# Patient Record
Sex: MALE | Employment: FULL TIME | URBAN - METROPOLITAN AREA
[De-identification: names, ages, dates, MRNs, and addresses within clinical notes are randomized per-mention and may not be internally consistent; named-entity substitution may affect disease eponyms.]

---

## 2018-10-17 ENCOUNTER — ED HISTORICAL/CONVERTED ENCOUNTER (OUTPATIENT)
Dept: OTHER | Age: 38
End: 2018-10-17

## 2020-06-10 ENCOUNTER — APPOINTMENT (OUTPATIENT)
Dept: GENERAL RADIOLOGY | Age: 40
End: 2020-06-10
Attending: PHYSICIAN ASSISTANT
Payer: SELF-PAY

## 2020-06-10 ENCOUNTER — APPOINTMENT (OUTPATIENT)
Dept: CT IMAGING | Age: 40
End: 2020-06-10
Attending: PHYSICIAN ASSISTANT
Payer: SELF-PAY

## 2020-06-10 ENCOUNTER — HOSPITAL ENCOUNTER (EMERGENCY)
Age: 40
Discharge: HOME | End: 2020-06-10
Attending: EMERGENCY MEDICINE | Admitting: EMERGENCY MEDICINE
Payer: SELF-PAY

## 2020-06-10 VITALS
HEIGHT: 66 IN | HEART RATE: 96 BPM | OXYGEN SATURATION: 95 % | WEIGHT: 172 LBS | SYSTOLIC BLOOD PRESSURE: 147 MMHG | BODY MASS INDEX: 27.64 KG/M2 | TEMPERATURE: 97.2 F | RESPIRATION RATE: 16 BRPM | DIASTOLIC BLOOD PRESSURE: 99 MMHG

## 2020-06-10 DIAGNOSIS — S69.92XA INJURY OF FINGER OF LEFT HAND, INITIAL ENCOUNTER: ICD-10-CM

## 2020-06-10 DIAGNOSIS — S89.92XA INJURY OF LEFT LOWER LEG, INITIAL ENCOUNTER: ICD-10-CM

## 2020-06-10 DIAGNOSIS — S16.1XXA NECK STRAIN, INITIAL ENCOUNTER: ICD-10-CM

## 2020-06-10 DIAGNOSIS — M54.50 LOW BACK PAIN WITHOUT SCIATICA, UNSPECIFIED BACK PAIN LATERALITY, UNSPECIFIED CHRONICITY: ICD-10-CM
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
## 2020-06-10 PROCEDURE — 73590 X-RAY EXAM OF LOWER LEG: CPT

## 2020-06-10 PROCEDURE — 99282 EMERGENCY DEPT VISIT SF MDM: CPT

## 2020-06-10 PROCEDURE — 73130 X-RAY EXAM OF HAND: CPT

## 2020-06-10 PROCEDURE — 72125 CT NECK SPINE W/O DYE: CPT

## 2020-06-10 RX ORDER — CYCLOBENZAPRINE HCL 10 MG
10 TABLET ORAL
Qty: 20 TAB | Refills: 0 | Status: SHIPPED | OUTPATIENT
Start: 2020-06-10

## 2020-06-10 RX ORDER — KETOROLAC TROMETHAMINE 10 MG/1
10 TABLET, FILM COATED ORAL
Qty: 20 TAB | Refills: 0 | Status: SHIPPED | OUTPATIENT
Start: 2020-06-10

## 2020-06-10 RX ORDER — ACETAMINOPHEN 325 MG/1
650 TABLET ORAL
Qty: 20 TAB | Refills: 0 | Status: SHIPPED | OUTPATIENT
Start: 2020-06-10 | End: 2022-04-07 | Stop reason: ALTCHOICE

## 2020-06-10 NOTE — LETTER
[address] [place] [address] [facility] EMERGENCY DEPT 
[address] [facility] [address] [phone] Work/School Note Date: 6/10/2020 To Whom It May concern: 
 
Ld Miguel was seen and treated today in the emergency room by the following provider(s): 
Attending Provider: Vita Belle MD 
Physician Assistant: ELENO Daniels. Ld Miguel may return to work on 6/13/20 Sincerely, ELENO Pettit
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
## 2020-06-10 NOTE — ED NOTES
noticed in patient's hospital chart patient's insurance and PCP was not listed.       went into PIT to assess patient. Patient stated confirmed he did not have insurance and he does not see a PCP.       and patient spoke about [facility].  explained to patient what  [facility] is and the benefits of applying. Patient  was given a list of documentation needed. Patient was told to bring in ID, Proof of 3 months of income and complete bank statement. Patient gave  a copy of his ID.  made copy and handed patient his ID back.      encouraged patient to apply for medicaid. Patient was given [employer] phone number and [employer] web site.       explained the importance of having a PCP.  offered to help find patient a PCP and have him scheduled for follow-up care. Patient stated he needs to talk it over with her sister surekha and his wife will give  a call.      Patient was given  contact information and [facility].
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
## 2022-03-29 ENCOUNTER — HOSPITAL ENCOUNTER (EMERGENCY)
Age: 42
Discharge: HOME | End: 2022-03-29
Attending: EMERGENCY MEDICINE | Admitting: EMERGENCY MEDICINE
Payer: COMMERCIAL

## 2022-03-29 ENCOUNTER — APPOINTMENT (OUTPATIENT)
Dept: GENERAL RADIOLOGY | Age: 42
End: 2022-03-29
Attending: EMERGENCY MEDICINE
Payer: COMMERCIAL

## 2022-03-29 VITALS
OXYGEN SATURATION: 96 % | WEIGHT: 170 LBS | SYSTOLIC BLOOD PRESSURE: 149 MMHG | HEIGHT: 66 IN | HEART RATE: 88 BPM | TEMPERATURE: 98.1 F | BODY MASS INDEX: 27.32 KG/M2 | DIASTOLIC BLOOD PRESSURE: 112 MMHG | RESPIRATION RATE: 18 BRPM

## 2022-03-29 DIAGNOSIS — S52.592A OTHER CLOSED FRACTURE OF DISTAL END OF LEFT RADIUS, INITIAL ENCOUNTER: Primary | ICD-10-CM

## 2022-03-29 PROCEDURE — 74011250637 HC RX REV CODE- 250/637: Performed by: EMERGENCY MEDICINE

## 2022-03-29 PROCEDURE — 99283 EMERGENCY DEPT VISIT LOW MDM: CPT

## 2022-03-29 PROCEDURE — 73110 X-RAY EXAM OF WRIST: CPT

## 2022-03-29 PROCEDURE — 75810000053 HC SPLINT APPLICATION

## 2022-03-29 RX ORDER — HYDROCODONE BITARTRATE AND ACETAMINOPHEN 5; 325 MG/1; MG/1
1 TABLET ORAL ONCE
Status: COMPLETED | OUTPATIENT
Start: 2022-03-29 | End: 2022-03-29

## 2022-03-29 RX ORDER — HYDROCODONE BITARTRATE AND ACETAMINOPHEN 5; 325 MG/1; MG/1
1 TABLET ORAL
Qty: 12 TABLET | Refills: 0 | Status: SHIPPED | OUTPATIENT
Start: 2022-03-29 | End: 2022-04-01

## 2022-03-29 RX ADMIN — HYDROCODONE BITARTRATE AND ACETAMINOPHEN 1 TABLET: 5; 325 TABLET ORAL at 01:59
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
## 2022-03-29 NOTE — ED NOTES
I have reviewed discharge instructions and medications  with the patient. The patient verbalized understanding.

## 2022-04-07 ENCOUNTER — OFFICE VISIT (OUTPATIENT)
Dept: ORTHOPEDIC SURGERY | Age: 42
End: 2022-04-07
Payer: COMMERCIAL

## 2022-04-07 VITALS
HEART RATE: 89 BPM | WEIGHT: 173 LBS | HEIGHT: 65 IN | SYSTOLIC BLOOD PRESSURE: 125 MMHG | DIASTOLIC BLOOD PRESSURE: 89 MMHG | BODY MASS INDEX: 28.82 KG/M2 | OXYGEN SATURATION: 99 %

## 2022-04-07 DIAGNOSIS — S52.572A OTHER CLOSED INTRA-ARTICULAR FRACTURE OF DISTAL END OF LEFT RADIUS, INITIAL ENCOUNTER: Primary | ICD-10-CM

## 2022-04-07 DIAGNOSIS — G56.02 LEFT CARPAL TUNNEL SYNDROME: ICD-10-CM

## 2022-04-07 PROCEDURE — 99203 OFFICE O/P NEW LOW 30 MIN: CPT | Performed by: ORTHOPAEDIC SURGERY

## 2022-04-07 RX ORDER — HYDROCODONE BITARTRATE AND ACETAMINOPHEN 5; 325 MG/1; MG/1
1 TABLET ORAL
Qty: 20 TABLET | Refills: 0 | Status: SHIPPED | OUTPATIENT
Start: 2022-04-07 | End: 2022-04-12

## 2022-04-07 NOTE — LETTER
Patient: Pablito Preston                        PROCEDURE: Left Distal Radius Open Reduction Internal Fixation, Possible Carpal Tunnel Release    PRE OPERATIVE INSTRUCTIONS:  Five (5) days prior to surgery STOP taking any hormonal medications, aspirin, fish oil and/or anti-inflammatory medications (this includes ibuprofens and Aleve). Tylenol is okay during this time. If you are taking blood thinner medication (such as Coumadin, Plavix, Heparin or others) you will need special instructions from the prescribing physician. LABS: Expect a call from a PAT nurse to complete a health assessment. o NO LABS NECESSARY    Surgery Date: 4/12/2022  Time: 12:30pm  Report to [facility] on the [address] [place] [address] at: 10:30am    THE DAY OF SURGERY:         1. Do not eat, chew gum or drink anything after midnight prior to the date of your surgery. 2. Take your blood pressure and/or heart medications with small sips of water unless otherwise instructed. If you are insulin dependent, bring your insulin with you, unless otherwise instructed. 3. Bring a list of your medications and the dosage to the hospital, including  vitamins. 4. Do not wear nail polish, acrylic nails, make-up, jewelry, perfumes or skin  creams. 5. Do not bring valuables or money to the hospital.        6. You MUST have a responsible adult arranged to drive you home following surgery. It is recommended that they stay with you for 24 hours after surgery. Post op visit  appointment is scheduled with Dr. Dennis Mayfield       on 4/25/2022 @ 11:30am at the [facility] office.       Mckenna Escobar, Surgery Scheduler  [phone]

## 2022-04-07 NOTE — LETTER
[address] [facility]    Surgery Request Form for the Operating Room at [facility]        Fax to [phone]                                        Telephone: [phone] or [phone]    To be completed by Physician Office:    Date: 2022    Requested by:  Alka Johnson    Phone No: [phone]  Fax No:  [phone]      Surgery Date: 2022    Requested Time: 12:30pm                Surgeon: Dr. Nate Meza DO  Assistant/2nd Surgeon: Catrina SA    **Please [place]: URGENT      EMERGENT ELECTIVE  CPT CODE Procedure   65609 Left Distal Radius ORIF   65354 Possible Left Carpal Tunnel Release         ICD10 code(s): Other closed intra-articular fracture of distal end of left radius, initial encounter   S52.572A   Left carpal tunnel syndrome G56.02    Patient Information:    Name: Delorse Hearing    SSN: [id number]  : 1980   Male/Female: male    Home Phone No: [phone] (home)     Primary Insurance: [address] [place] [address] [facility]  [id number]     Admission:  Outpatient      Anesthesia Type: General and supraclavicular block     Comments/Special Equipment and/or :  Acumed     MINI C-Arm, Hand Table

## 2022-04-07 NOTE — PERIOP NOTES
PRE-SURGICAL INSTRUCTIONS        Patient's Name:  Chelsey Crane      Today's Date:  4/7/2022            Covid Testing Date and Time:    Surgery Date:  4/12/2022                1. Do NOT eat or drink anything, including candy, gum, or ice chips after midnight on 4/11/22, unless you have specific instructions from your surgeon or anesthesia provider to do so.  2. You may brush your teeth before coming to the hospital.  3. No smoking 24 hours prior to the day of surgery. 4. No alcohol 24 hours prior to the day of surgery. 5. No recreational drugs for one week prior to the day of surgery. 6. Leave all valuables, including money/purse, at home. 7. Remove all jewelry, nail polish, acrylic nails, and makeup (including mascara); no lotions powders, deodorant, or perfume/cologne/after shave on the skin. 8. Follow instruction for Hibiclens washes and CHG wipes from surgeon's office. 9. Glasses/contact lenses and dentures may be worn to the hospital.  They will be removed prior to surgery. 10. Call your doctor if symptoms of a cold or illness develop within 24-48 hours prior to your surgery. 11.  If you are having an outpatient procedure, please make arrangements for a responsible ADULT TO [address] [place] [address] and stay with you for 24 hours after your surgery. 12. ONE VISITOR in the hospital at this time for outpatient procedures. Exceptions may be made for surgical admissions, per nursing unit guidelines      Special Instructions:      Bring list of CURRENT medications. Bring any pertinent legal medical records. Follow physician instructions about stopping anticoagulants. On the day of surgery, come in the main entrance of [facility]. Let the  at the desk know you are there for surgery. A staff member will come escort you to the surgical area on the second floor.     If you have any questions or concerns, please do not hesitate to call:     (Prior to the day of surgery) [facility] department:  [phone]   (Day of surgery) Pre-Op department:  [phone]    These surgical instructions were reviewed with patient during the PAT phone call.
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
## 2022-04-11 ENCOUNTER — ANESTHESIA EVENT (OUTPATIENT)
Dept: SURGERY | Age: 42
End: 2022-04-11
Payer: COMMERCIAL

## 2022-04-11 PROBLEM — S52.532D CLOSED COLLES' FRACTURE OF LEFT RADIUS WITH ROUTINE HEALING: Status: ACTIVE | Noted: 2022-04-11

## 2022-04-11 PROBLEM — G56.02 LEFT CARPAL TUNNEL SYNDROME: Status: ACTIVE | Noted: 2022-04-11
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
## 2022-04-12 ENCOUNTER — ANESTHESIA (OUTPATIENT)
Dept: SURGERY | Age: 42
End: 2022-04-12
Payer: COMMERCIAL

## 2022-04-12 ENCOUNTER — HOSPITAL ENCOUNTER (OUTPATIENT)
Age: 42
Setting detail: OUTPATIENT SURGERY
Discharge: HOME | End: 2022-04-12
Attending: ORTHOPAEDIC SURGERY | Admitting: ORTHOPAEDIC SURGERY
Payer: COMMERCIAL

## 2022-04-12 VITALS
HEART RATE: 95 BPM | HEIGHT: 65 IN | DIASTOLIC BLOOD PRESSURE: 82 MMHG | TEMPERATURE: 98.1 F | WEIGHT: 169 LBS | SYSTOLIC BLOOD PRESSURE: 123 MMHG | OXYGEN SATURATION: 93 % | BODY MASS INDEX: 28.16 KG/M2 | RESPIRATION RATE: 16 BRPM

## 2022-04-12 PROCEDURE — 74011250636 HC RX REV CODE- 250/636: Performed by: NURSE ANESTHETIST, CERTIFIED REGISTERED

## 2022-04-12 PROCEDURE — 25607 OPTX DST RD XARTC FX/EPI SEP: CPT | Performed by: ORTHOPAEDIC SURGERY

## 2022-04-12 PROCEDURE — C1769 GUIDE WIRE: HCPCS | Performed by: ORTHOPAEDIC SURGERY

## 2022-04-12 PROCEDURE — 76010000153 HC OR TIME 1.5 TO 2 HR: Performed by: ORTHOPAEDIC SURGERY

## 2022-04-12 PROCEDURE — 77030013079 HC BLNKT BAIR HGGR 3M -A: Performed by: ANESTHESIOLOGY

## 2022-04-12 PROCEDURE — L3650 SO 8 ABD RESTRAINT PRE OTS: HCPCS | Performed by: ORTHOPAEDIC SURGERY

## 2022-04-12 PROCEDURE — C1713 ANCHOR/SCREW BN/BN,TIS/BN: HCPCS | Performed by: ORTHOPAEDIC SURGERY

## 2022-04-12 PROCEDURE — 74011250636 HC RX REV CODE- 250/636: Performed by: ORTHOPAEDIC SURGERY

## 2022-04-12 PROCEDURE — 76942 ECHO GUIDE FOR BIOPSY: CPT | Performed by: ANESTHESIOLOGY

## 2022-04-12 PROCEDURE — 77030012510 HC MSK AIRWY LMA TELE -B: Performed by: ANESTHESIOLOGY

## 2022-04-12 PROCEDURE — 74011250637 HC RX REV CODE- 250/637: Performed by: NURSE ANESTHETIST, CERTIFIED REGISTERED

## 2022-04-12 PROCEDURE — 77030008829 HC WRE K ACMD -B: Performed by: ORTHOPAEDIC SURGERY

## 2022-04-12 PROCEDURE — 77030040356 HC CORD BPLR FRCP COVD -A: Performed by: ORTHOPAEDIC SURGERY

## 2022-04-12 PROCEDURE — 77030018836 HC SOL IRR NACL ICUM -A: Performed by: ORTHOPAEDIC SURGERY

## 2022-04-12 PROCEDURE — 76210000016 HC OR PH I REC 1 TO 1.5 HR: Performed by: ORTHOPAEDIC SURGERY

## 2022-04-12 PROCEDURE — 01830 ANES ARTHR/NDSC WRST/HND NOS: CPT | Performed by: NURSE ANESTHETIST, CERTIFIED REGISTERED

## 2022-04-12 PROCEDURE — 77030040922 HC BLNKT HYPOTHRM STRY -A: Performed by: ORTHOPAEDIC SURGERY

## 2022-04-12 PROCEDURE — 77030003736 HC BIT DRL ACMD -B: Performed by: ORTHOPAEDIC SURGERY

## 2022-04-12 PROCEDURE — 77030002933 HC SUT MCRYL J&J -A: Performed by: ORTHOPAEDIC SURGERY

## 2022-04-12 PROCEDURE — 77030006689 HC BLD OPHTH BVR BD -A: Performed by: ORTHOPAEDIC SURGERY

## 2022-04-12 PROCEDURE — 74011000250 HC RX REV CODE- 250: Performed by: NURSE ANESTHETIST, CERTIFIED REGISTERED

## 2022-04-12 PROCEDURE — 74011250636 HC RX REV CODE- 250/636: Performed by: ANESTHESIOLOGY

## 2022-04-12 PROCEDURE — 74011000250 HC RX REV CODE- 250: Performed by: ORTHOPAEDIC SURGERY

## 2022-04-12 PROCEDURE — 76210000021 HC REC RM PH II 0.5 TO 1 HR: Performed by: ORTHOPAEDIC SURGERY

## 2022-04-12 PROCEDURE — 64415 NJX AA&/STRD BRCH PLXS IMG: CPT | Performed by: ANESTHESIOLOGY

## 2022-04-12 PROCEDURE — 77030003737 HC BIT DRL ACMD -C: Performed by: ORTHOPAEDIC SURGERY

## 2022-04-12 PROCEDURE — 2709999900 HC NON-CHARGEABLE SUPPLY: Performed by: ORTHOPAEDIC SURGERY

## 2022-04-12 PROCEDURE — 01830 ANES ARTHR/NDSC WRST/HND NOS: CPT | Performed by: ANESTHESIOLOGY

## 2022-04-12 PROCEDURE — 77030003601 HC NDL NRV BLK BBMI -A: Performed by: ORTHOPAEDIC SURGERY

## 2022-04-12 PROCEDURE — 74011000250 HC RX REV CODE- 250: Performed by: ANESTHESIOLOGY

## 2022-04-12 PROCEDURE — 76060000034 HC ANESTHESIA 1.5 TO 2 HR: Performed by: ORTHOPAEDIC SURGERY

## 2022-04-12 PROCEDURE — 77030040361 HC SLV COMPR DVT MDII -B: Performed by: ORTHOPAEDIC SURGERY

## 2022-04-12 DEVICE — 3.5MM X 14MM NON-LOCKING HEXALOBE SCREW
Type: IMPLANTABLE DEVICE | Site: WRIST | Status: FUNCTIONAL
Brand: ACUMED

## 2022-04-12 DEVICE — 2.3MM X 20MM LOCKING CORTICAL SCREW
Type: IMPLANTABLE DEVICE | Site: WRIST | Status: FUNCTIONAL
Brand: ACUMED

## 2022-04-12 DEVICE — 3.5MM X 12MM LOCKING HEXALOBE SCREW
Type: IMPLANTABLE DEVICE | Site: WRIST | Status: FUNCTIONAL
Brand: ACUMED

## 2022-04-12 DEVICE — 2.3MM X 22MM LOCKING CORTICAL SCREW
Type: IMPLANTABLE DEVICE | Site: WRIST | Status: FUNCTIONAL
Brand: ACUMED

## 2022-04-12 DEVICE — 3.5MM X 14MM LOCKING HEXALOBE SCREW
Type: IMPLANTABLE DEVICE | Site: WRIST | Status: FUNCTIONAL
Brand: ACUMED

## 2022-04-12 DEVICE — 2.3MM X 16MM LOCKING CORTICAL SCREW
Type: IMPLANTABLE DEVICE | Site: WRIST | Status: FUNCTIONAL
Brand: ACUMED

## 2022-04-12 DEVICE — 2.3MM X 14MM LOCKING CORTICAL SCREW
Type: IMPLANTABLE DEVICE | Site: WRIST | Status: FUNCTIONAL
Brand: ACUMED

## 2022-04-12 DEVICE — ACU-LOC® 2 VDR PLT STD L
Type: IMPLANTABLE DEVICE | Site: WRIST | Status: FUNCTIONAL
Brand: ACUMED

## 2022-04-12 RX ORDER — SODIUM CHLORIDE 0.9 % (FLUSH) 0.9 %
5-40 SYRINGE (ML) INJECTION EVERY 8 HOURS
Status: DISCONTINUED | OUTPATIENT
Start: 2022-04-12 | End: 2022-04-12 | Stop reason: HOSPADM

## 2022-04-12 RX ORDER — FENTANYL CITRATE 50 UG/ML
100 INJECTION, SOLUTION INTRAMUSCULAR; INTRAVENOUS
Status: DISCONTINUED | OUTPATIENT
Start: 2022-04-12 | End: 2022-04-12 | Stop reason: HOSPADM

## 2022-04-12 RX ORDER — SODIUM CHLORIDE, SODIUM LACTATE, POTASSIUM CHLORIDE, CALCIUM CHLORIDE 600; 310; 30; 20 MG/100ML; MG/100ML; MG/100ML; MG/100ML
25 INJECTION, SOLUTION INTRAVENOUS CONTINUOUS
Status: DISCONTINUED | OUTPATIENT
Start: 2022-04-12 | End: 2022-04-12 | Stop reason: HOSPADM

## 2022-04-12 RX ORDER — MAGNESIUM SULFATE 100 %
4 CRYSTALS MISCELLANEOUS AS NEEDED
Status: DISCONTINUED | OUTPATIENT
Start: 2022-04-12 | End: 2022-04-12 | Stop reason: HOSPADM

## 2022-04-12 RX ORDER — FENTANYL CITRATE 50 UG/ML
25 INJECTION, SOLUTION INTRAMUSCULAR; INTRAVENOUS AS NEEDED
Status: DISCONTINUED | OUTPATIENT
Start: 2022-04-12 | End: 2022-04-12 | Stop reason: HOSPADM

## 2022-04-12 RX ORDER — FENTANYL CITRATE 50 UG/ML
50 INJECTION, SOLUTION INTRAMUSCULAR; INTRAVENOUS
Status: DISCONTINUED | OUTPATIENT
Start: 2022-04-12 | End: 2022-04-12 | Stop reason: HOSPADM

## 2022-04-12 RX ORDER — LIDOCAINE HYDROCHLORIDE 20 MG/ML
INJECTION, SOLUTION EPIDURAL; INFILTRATION; INTRACAUDAL; PERINEURAL AS NEEDED
Status: DISCONTINUED | OUTPATIENT
Start: 2022-04-12 | End: 2022-04-12 | Stop reason: HOSPADM

## 2022-04-12 RX ORDER — ROPIVACAINE HYDROCHLORIDE 5 MG/ML
30 INJECTION, SOLUTION EPIDURAL; INFILTRATION; PERINEURAL
Status: COMPLETED | OUTPATIENT
Start: 2022-04-12 | End: 2022-04-12

## 2022-04-12 RX ORDER — MIDAZOLAM HYDROCHLORIDE 1 MG/ML
2 INJECTION, SOLUTION INTRAMUSCULAR; INTRAVENOUS
Status: DISCONTINUED | OUTPATIENT
Start: 2022-04-12 | End: 2022-04-12 | Stop reason: HOSPADM

## 2022-04-12 RX ORDER — PROPOFOL 10 MG/ML
INJECTION, EMULSION INTRAVENOUS AS NEEDED
Status: DISCONTINUED | OUTPATIENT
Start: 2022-04-12 | End: 2022-04-12 | Stop reason: HOSPADM

## 2022-04-12 RX ORDER — LIDOCAINE HYDROCHLORIDE 20 MG/ML
INJECTION, SOLUTION EPIDURAL; INFILTRATION; INTRACAUDAL; PERINEURAL
Status: COMPLETED | OUTPATIENT
Start: 2022-04-12 | End: 2022-04-12

## 2022-04-12 RX ORDER — ONDANSETRON 2 MG/ML
INJECTION INTRAMUSCULAR; INTRAVENOUS AS NEEDED
Status: DISCONTINUED | OUTPATIENT
Start: 2022-04-12 | End: 2022-04-12 | Stop reason: HOSPADM

## 2022-04-12 RX ORDER — ROPIVACAINE HYDROCHLORIDE 5 MG/ML
INJECTION, SOLUTION EPIDURAL; INFILTRATION; PERINEURAL
Status: COMPLETED | OUTPATIENT
Start: 2022-04-12 | End: 2022-04-12

## 2022-04-12 RX ORDER — FENTANYL CITRATE 50 UG/ML
INJECTION, SOLUTION INTRAMUSCULAR; INTRAVENOUS AS NEEDED
Status: DISCONTINUED | OUTPATIENT
Start: 2022-04-12 | End: 2022-04-12 | Stop reason: HOSPADM

## 2022-04-12 RX ORDER — OXYCODONE AND ACETAMINOPHEN 5; 325 MG/1; MG/1
1 TABLET ORAL AS NEEDED
Status: DISCONTINUED | OUTPATIENT
Start: 2022-04-12 | End: 2022-04-12 | Stop reason: HOSPADM

## 2022-04-12 RX ORDER — SODIUM CHLORIDE 0.9 % (FLUSH) 0.9 %
5-40 SYRINGE (ML) INJECTION AS NEEDED
Status: DISCONTINUED | OUTPATIENT
Start: 2022-04-12 | End: 2022-04-12 | Stop reason: HOSPADM

## 2022-04-12 RX ORDER — SODIUM CHLORIDE, SODIUM LACTATE, POTASSIUM CHLORIDE, CALCIUM CHLORIDE 600; 310; 30; 20 MG/100ML; MG/100ML; MG/100ML; MG/100ML
50 INJECTION, SOLUTION INTRAVENOUS CONTINUOUS
Status: DISCONTINUED | OUTPATIENT
Start: 2022-04-12 | End: 2022-04-12 | Stop reason: HOSPADM

## 2022-04-12 RX ORDER — KETOROLAC TROMETHAMINE 15 MG/ML
INJECTION, SOLUTION INTRAMUSCULAR; INTRAVENOUS AS NEEDED
Status: DISCONTINUED | OUTPATIENT
Start: 2022-04-12 | End: 2022-04-12 | Stop reason: HOSPADM

## 2022-04-12 RX ORDER — DEXTROSE MONOHYDRATE 100 MG/ML
0-250 INJECTION, SOLUTION INTRAVENOUS AS NEEDED
Status: DISCONTINUED | OUTPATIENT
Start: 2022-04-12 | End: 2022-04-12 | Stop reason: HOSPADM

## 2022-04-12 RX ORDER — LIDOCAINE HYDROCHLORIDE 10 MG/ML
0.1 INJECTION, SOLUTION EPIDURAL; INFILTRATION; INTRACAUDAL; PERINEURAL AS NEEDED
Status: DISCONTINUED | OUTPATIENT
Start: 2022-04-12 | End: 2022-04-12 | Stop reason: HOSPADM

## 2022-04-12 RX ORDER — FAMOTIDINE 20 MG/1
20 TABLET, FILM COATED ORAL ONCE
Status: COMPLETED | OUTPATIENT
Start: 2022-04-12 | End: 2022-04-12

## 2022-04-12 RX ORDER — MIDAZOLAM HYDROCHLORIDE 1 MG/ML
INJECTION, SOLUTION INTRAMUSCULAR; INTRAVENOUS AS NEEDED
Status: DISCONTINUED | OUTPATIENT
Start: 2022-04-12 | End: 2022-04-12 | Stop reason: HOSPADM

## 2022-04-12 RX ADMIN — LIDOCAINE HYDROCHLORIDE 1 ML: 20 INJECTION, SOLUTION EPIDURAL; INFILTRATION; INTRACAUDAL; PERINEURAL at 09:28

## 2022-04-12 RX ADMIN — SODIUM CHLORIDE, POTASSIUM CHLORIDE, SODIUM LACTATE AND CALCIUM CHLORIDE 50 ML/HR: 600; 310; 30; 20 INJECTION, SOLUTION INTRAVENOUS at 09:00

## 2022-04-12 RX ADMIN — FENTANYL CITRATE 100 MCG: 50 INJECTION INTRAMUSCULAR; INTRAVENOUS at 09:23

## 2022-04-12 RX ADMIN — FENTANYL CITRATE 50 MCG: 50 INJECTION, SOLUTION INTRAMUSCULAR; INTRAVENOUS at 10:58

## 2022-04-12 RX ADMIN — ROPIVACAINE HYDROCHLORIDE 20 ML: 5 INJECTION, SOLUTION EPIDURAL; INFILTRATION; PERINEURAL at 09:28

## 2022-04-12 RX ADMIN — MIDAZOLAM 2 MG: 1 INJECTION INTRAMUSCULAR; INTRAVENOUS at 09:23

## 2022-04-12 RX ADMIN — LIDOCAINE HYDROCHLORIDE 40 MG: 20 INJECTION, SOLUTION EPIDURAL; INFILTRATION; INTRACAUDAL; PERINEURAL at 10:22

## 2022-04-12 RX ADMIN — MIDAZOLAM HYDROCHLORIDE 2 MG: 2 INJECTION, SOLUTION INTRAMUSCULAR; INTRAVENOUS at 10:13

## 2022-04-12 RX ADMIN — ROPIVACAINE HYDROCHLORIDE 30 ML: 5 INJECTION, SOLUTION EPIDURAL; INFILTRATION; PERINEURAL at 09:20

## 2022-04-12 RX ADMIN — PROPOFOL 200 MG: 10 INJECTION, EMULSION INTRAVENOUS at 10:22

## 2022-04-12 RX ADMIN — CEFAZOLIN SODIUM 2 G: 1 INJECTION, POWDER, FOR SOLUTION INTRAMUSCULAR; INTRAVENOUS at 10:26

## 2022-04-12 RX ADMIN — ONDANSETRON 4 MG: 2 INJECTION INTRAMUSCULAR; INTRAVENOUS at 11:25

## 2022-04-12 RX ADMIN — FAMOTIDINE 20 MG: 20 TABLET ORAL at 09:14

## 2022-04-12 RX ADMIN — LIDOCAINE HYDROCHLORIDE 2 ML: 10 INJECTION, SOLUTION EPIDURAL; INFILTRATION; INTRACAUDAL; PERINEURAL at 09:23

## 2022-04-12 RX ADMIN — FENTANYL CITRATE 50 MCG: 50 INJECTION, SOLUTION INTRAMUSCULAR; INTRAVENOUS at 10:22

## 2022-04-12 RX ADMIN — KETOROLAC TROMETHAMINE 15 MG: 15 INJECTION, SOLUTION INTRAMUSCULAR; INTRAVENOUS at 11:25

## 2022-04-12 NOTE — OP NOTES
Operative Report    Patient: Chelsey Crane MRN: [id number]  SSN: [id number]    YOB: 1980  Age: 35 y.o. Sex: male       Date of Surgery: 4/12/2022     Preoperative Diagnosis: Other closed intra-articular fracture of distal end of left radius, initial encounter [S52.722A]    Postoperative Diagnosis: Other closed intra-articular fracture of distal end of left radius, initial encounter [S52.859R]    Surgeon(s) and Role:     Karena West,  - Primary    Assistant: Joel Youssef    Anesthesia: General, Block, and local    Procedure: Procedure(s):  LEFT DISTAL RADIUS OPEN REDUCTION INTERNAL FIXATION    Findings: 2 part extra-articular distal radius fracture. Procedure in Detail:     Indications for procedure been outlined in the perioperative documentation, most notably being not amenable to conservative treatment. Informed consent was obtained from the patient. The risks and benefits of the procedure were discussed with the patient. They include but are not limited to neurovascular injury, tendon/ligamentous injury, blood loss, infection, malunion, nonunion, posttraumatic arthritis, need for further surgery, hematoma, neuroma, seroma, chronic pain, chronic stiffness, complications from anesthesia including death, and the possibility of aaron Covid. After informed consent was obtained, the patient was taken back to the operative suite. A tourniquet was applied to the operative extremity and it was prepped and draped in the normal sterile fashion. The arm was exsanguinated and the tourniquet was elevated to 250 mmHg. Attention was turned to the wrist where an incision was made volarly over the FCR tendon. The subcutaneous tissues were dissected and electrocautery was used for hemostasis. The volar aspect of the FCR sheath was incised and the tendon was retracted ulnarly. The dorsal floor of the sheath was incised and the FPL was identified and retracted ulnarly.   The pronator quadratus was incised and elevated off of the distal radius. The fracture was identified and cleared of any hematoma or fibrous tissue. Using gross reduction means the distal radius was reduced with a K wire if needed. Fluoroscopy was used to aid in the reduction of the distal radius. The appropriate sized plate was fitted to the distal radius with the aid of fluoroscopy to determine how the plate sits distally as well as radially and ulnarly. After the plate was found to be in good position it was pinned distally and proximally. At this point in an ulnar to distal fashion the screws were placed in the distal aspect of the plate. The the distal most ulnar most hole was filled with a nonlocking screw initially to allow for compression of the plate down to the radius. This was followed sequentially in a radial direction. After second screw was placed, the K wire was removed. After all distal holes were filled the radial styloid holes were drilled and filled as appropriate. Confirmation on fluoroscopy was undertaken to determine if both styloid holes needed to be filled. At this point the proximal row screw was placed. The nonlocking screw was subsequently replaced with a locking screw. Attention was then turned to the proximal aspect of the plate where the K wire was removed and the oblong hole was filled using a drill guide. Measurement was taken and after the appropriate size screw was placed, if the fracture needed to be lengthened traction was undertaken while the screw was tightened down. After this the remaining 2 shaft holes were filled with locking screws. Final images were taken in appropriate position of the fracture as well as alignment of the distal radius and wrist joint were assessed on fluoroscopy. Appropriate screw length was also assessed and changed as needed.   The wound was copiously irrigated and quarter percent Marcaine plain mixed with Exparel was injected into the deep tissues, the periosteum surrounding the fracture, and the subcutaneous tissues. The tourniquet was let down and electrocautery was used for hemostasis of any remaining bleeders. The wound was closed with 3-0 Monocryl followed by 4-0 Monocryl and Dermabond in the skin. The patient was placed into a volar splint. She was sent to recovery in stable condition. She was given appropriate wound care instructions, follow-up with me in the outpatient setting, and a prescription for pain medicine. Estimated Blood Loss: 10 mL    Tourniquet Time:   Total Tourniquet Time Documented:  Upper Arm (Left) - 46 minutes  Total: Upper Arm (Left) - 46 minutes        Implants:   Implant Name Type Inv.  Item Serial No.  Lot No. LRB No. Used Action   PLATE STD VDR ACULOC 2 LT --  - [id number]  PLATE STD VDR ACULOC 2 LT --   [employer] NA Left 1 Implanted   SCR BNE PAULY LCK BREEZY 2.3X22MM --  - [id number]  SCR BNE PAULY LCK BREEZY 2.3X22MM --   [employer] NA Left 1 Implanted   SCR BNE Jullie Roch 3.5X14MM -- F/ELBOW PLT SYS - [id number]  SCR BNE Jullie Roch 3.5X14MM -- F/ELBOW PLT SYS  [employer] NA Left 1 Implanted   SCR BNE PAULY LCK BREEZY 2.3X20MM --  - [id number]  SCR BNE PAULY LCK BREEZY 2.3X20MM --   [employer] NA Left 3 Implanted   SCR BNE PAULY LCK BREEZY 2.3X14MM --  - [id number]  SCR BNE PAULY LCK BREEZY 2.3X14MM --   [employer] NA Left 1 Implanted   SCR BNE PAULY LCK BREEZY 2.3X16MM --  - [id number]  SCR BNE PAULY LCK BREEZY 2.3X16MM --   [employer] NA Left 2 Implanted   SCR BNE LCK HEXALOBE 3.5X14MM -- F/ELBOW PLT SYS - [id number]  SCR BNE LCK HEXALOBE 3.5X14MM -- F/ELBOW PLT SYS  [employer] NA Left 1 Implanted   SCR BNE LCK HEXALOBE 3.5X12MM -- F/ELBOW PLT SYS - [id number]  SCR BNE LCK HEXALOBE 3.5X12MM -- F/ELBOW PLT SYS  [employer] NA Left 1 Implanted               Specimens: * No specimens in log *        Drains: None                Complications: None    Counts: Sponge and needle counts were correct times two.     Signed By:  Esvin Craig DO     April 12, 2022

## 2022-04-12 NOTE — H&P
Update History & Physical    The Patient's History and Physical of April 7, 2022 was reviewed with the patient and I examined the patient. There was no change except that the previously seen numbness and tingling has resolved and was likely related to a splint that was placed too tight in the emergency setting. Due to this we will not proceed with carpal tunnel release but we will cautiously observe for any continued numbness and tingling. The surgical site was confirmed by the patient and me. Plan:  The risk, benefits, expected outcome, and alternative to the recommended procedure have been discussed with the patient. Patient understands and wants to proceed with the procedure.     Electronically signed by Elsa Dickens DO on 4/12/2022 at 9:47 AM

## 2022-04-12 NOTE — BRIEF OP NOTE
Brief Postoperative Note    Patient: Paul Dee  YOB: 1980  MRN: [id number]    Date of Procedure: 4/12/2022     Pre-Op Diagnosis: Other closed intra-articular fracture of distal end of left radius, initial encounter [S52.572A]  Left carpal tunnel syndrome [G56.02]    Post-Op Diagnosis: Same as preoperative diagnosis. Procedure(s):  LEFT DISTAL RADIUS OPEN REDUCTION INTERNAL FIXATION/POSSIBLE LEFT CARPAL TUNNEL RELEASE/MINI C-ARM/ACUMED/SUPRACLAVICULAR    Surgeon(s): Ora Dela Cruz DO    Surgical Assistant: Surg Asst-1: Tyrell Dixon    Anesthesia: General     Estimated Blood Loss (mL): less than 50     Complications: None    Specimens: * No specimens in log *     Implants:   Implant Name Type Inv.  Item Serial No.  Lot No. LRB No. Used Action   PLATE STD VDR ACULOC 2 LT --  - [id number]  PLATE STD VDR ACULOC 2 LT --   [employer] NA Left 1 Implanted   SCR BNE PAULY LCK BREEZY 2.3X22MM --  - [id number]  SCR BNE PAULY LCK BREEZY 2.3X22MM --   [employer] NA Left 1 Implanted   SCR BNE Ed Fuel 3.5X14MM -- F/ELBOW PLT SYS - [id number]  SCR BNE NLCK HEXALOBE 3.5X14MM -- F/ELBOW PLT SYS  [employer] NA Left 1 Implanted   SCR BNE PAULY LCK BREEZY 2.3X20MM --  - [id number]  SCR BNE PAULY LCK BREEZY 2.3X20MM --   [employer] NA Left 3 Implanted   SCR BNE PAULY LCK BREEZY 2.3X14MM --  - [id number]  SCR BNE PAULY LCK BREEZY 2.3X14MM --   [employer] NA Left 1 Implanted   SCR BNE PAULY LCK BREEZY 2.3X16MM --  - [id number]  SCR BNE PAULY LCK BREEZY 2.3X16MM --   [employer] NA Left 2 Implanted   SCR BNE LCK HEXALOBE 3.5X14MM -- F/ELBOW PLT SYS - [id number]  SCR BNE LCK HEXALOBE 3.5X14MM -- F/ELBOW PLT SYS  [employer] NA Left 1 Implanted   SCR BNE LCK HEXALOBE 3.5X12MM -- F/ELBOW PLT SYS - [id number]  SCR BNE LCK HEXALOBE 3.5X12MM -- F/ELBOW PLT SYS  [employer] NA Left 1 Implanted       Drains: * No LDAs found *    Findings: displaced fracture    Electronically Signed by Preston Cardoza DO on 4/12/2022 at 11:31 AM

## 2022-04-12 NOTE — ANESTHESIA PROCEDURE NOTES
Peripheral Block    Start time: 4/12/2022 9:23 AM  End time: 4/12/2022 9:29 AM  Performed by: Lillian Dill MD  Authorized by: Lillian Dill MD       Pre-procedure: Indications: at surgeon's request, post-op pain management and procedure for pain    Preanesthetic Checklist: patient identified, risks and benefits discussed, site marked, timeout performed, anesthesia consent given and patient being monitored    Timeout Time: 09:23 EDT          Block Type:   Block Type:  Axillary and supraclavicular  Laterality:  Left  Monitoring:  Standard ASA monitoring, continuous pulse ox, frequent vital sign checks, heart rate, responsive to questions and oxygen  Injection Technique:  Single shot  Procedures: ultrasound guided    Patient Position: seated  Prep: chlorhexidine    Location:  Supraclavicular  Needle Type:  Stimuplex  Needle Gauge:  21 G  Needle Localization:  Ultrasound guidance  Medication Injected:  Lidocaine (XYLOCAINE) Preserv-Free 2% injection, 1 mL  ropivacaine (PF) (NAROPIN)(0.5%) 5 mg/mL injection, 20 mL  Med Admin Time: 4/12/2022 9:28 AM    Assessment:  Number of attempts:  1  Injection Assessment:  Incremental injection every 5 mL, no paresthesia, ultrasound image on chart, no intravascular symptoms, local visualized surrounding nerve on ultrasound and negative aspiration for blood  Patient tolerance:  Patient tolerated the procedure well with no immediate complications  Location:  PREOP HOLDING    Patient given 2 mg IV Versed and 100 IV Fentanyl for sedation.     4/12/2022     9:29 AM     Sonia Beasley MD

## 2022-04-12 NOTE — ANESTHESIA POSTPROCEDURE EVALUATION
Procedure(s):  LEFT DISTAL RADIUS OPEN REDUCTION INTERNAL FIXATION/POSSIBLE LEFT CARPAL TUNNEL RELEASE/MINI C-ARM/ACUMED/SUPRACLAVICULAR. general, regional    Anesthesia Post Evaluation      Multimodal analgesia: multimodal analgesia used between 6 hours prior to anesthesia start to PACU discharge  Patient location during evaluation: bedside  Patient participation: complete - patient participated  Level of consciousness: awake  Pain score: 0  Pain management: adequate  Airway patency: patent  Anesthetic complications: no  Cardiovascular status: stable  Respiratory status: acceptable  Hydration status: acceptable  Post anesthesia nausea and vomiting:  controlled  Final Post Anesthesia Temperature Assessment:  Normothermia (36.0-37.5 degrees C)      INITIAL Post-op Vital signs:   Vitals Value Taken Time   /82 04/12/22 1243   Temp 36.2 °C (97.2 °F) 04/12/22 1206   Pulse 96 04/12/22 1252   Resp 18 04/12/22 1252   SpO2 95 % 04/12/22 1252   Vitals shown include unvalidated device data.

## 2022-04-12 NOTE — DISCHARGE INSTRUCTIONS
Ice and Elevate operative wound/dressing. Begin moving fingers immediately after surgery. Keep dressing clean and dry. Cover for showering. Do not remove dressing/splint. Patient Education        Open Reduction With Internal Fixation of a Limb: What to Expect at Home  Your Recovery  Your broken bone (fracture) was put into position and stabilized. You can expect some pain and swelling around the cut (incision) the doctor made. This should get better within a few days after your surgery. But it is normal to have some pain for 2 to 3 weeks after surgery and mild pain for up to 6 weeks after surgery. How soon you can return to work and your normal routine depends on your job and how long it takes the bone to heal. For example, if you have a fractured leg and you sit at work, you may be able to go back in 1 to 2 weeks. But if your job requires you to walk or stand a lot, you will need to wait until your fracture has healed before you go back to work. This care sheet gives you a general idea about how long it will take for you to recover. But each person recovers at a different pace. Follow the steps below to get better as quickly as possible. How can you care for yourself at home? Activity    · Rest when you feel tired. Getting enough sleep will help you recover.     · Increase your activity as recommended by your doctor. Being active boosts blood flow and helps prevent pneumonia and constipation. It's usually okay to exercise other parts of your body as soon as you feel well enough.     · Avoid putting weight on your repaired bone until your doctor says it is okay.     · You will probably need to take 1 to 2 weeks off from work. It depends on the type of work you do and how you feel.     · Do not shower for 1 or 2 days after surgery. When you shower, keep your dressing and incisions dry. If you have a cast, tape a sheet of plastic to cover it so that it does not get wet.  It may help to sit on a shower stool.     · Do not take a bath, swim, use a hot tub, or soak your affected limb until your incision is healed. This usually takes 1 to 2 weeks. Diet    · You can eat your normal diet. If your stomach is upset, try bland, low-fat foods like plain rice, broiled chicken, toast, and yogurt. Medicines    · Your doctor will tell you if and when you can restart your medicines. He or she will also give you instructions about taking any new medicines.     · If you take aspirin or some other blood thinner, ask your doctor if and when to start taking it again. Make sure that you understand exactly what your doctor wants you to do.     · Take pain medicines exactly as directed. ? If the doctor gave you a prescription medicine for pain, take it as prescribed. ? If you are not taking a prescription pain medicine, ask your doctor if you can take an over-the-counter medicine.     · If you think your pain medicine is making you sick to your stomach:  ? Take your medicine after meals (unless your doctor has told you not to). ? Ask your doctor for a different pain medicine.     · If your doctor prescribed antibiotics, take them as directed. Do not stop taking them just because you feel better. You need to take the full course of antibiotics. Incision care    · If you have strips of tape on the incision, leave the tape on for a week or until it falls off.     · If you do not have a cast, clean the incision 2 times a day after your doctor allows you to remove the bandage. Use only soap and water to clean the incision unless your doctor gives you different instructions. Don't use hydrogen peroxide or alcohol, which can slow healing. Exercise    · Do exercises as instructed by your doctor or physical therapist. These exercises will help keep your muscles strong and your joints flexible while your bone is healing.     · Wiggle your fingers or toes on the injured arm or leg often. This helps reduce swelling and stiffness.    Ice and elevation    · Prop up the injured arm or leg on a pillow when you ice it or anytime you sit or lie down during the first 1 to 2 weeks after your surgery. Try to keep it above the level of your heart. This will help reduce swelling and pain. Other instructions    · If you have a cast or splint:  ? Keep it dry. ? If you have a removable splint, ask your doctor if it is okay to take it off to bathe. Your doctor may want you to keep it on as much as possible. Be careful not to put the splint on too tight. ? Do not stick objects such as pencils or coat hangers in your cast or splint to scratch your skin. ? Do not put powder into your cast or splint to relieve itchy skin. ? Never cut or alter your cast or splint. Follow-up care is a key part of your treatment and safety. Be sure to make and go to all appointments, and call your doctor if you are having problems. It's also a good idea to know your test results and keep a list of the medicines you take. When should you call for help? Call 911 anytime you think you may need emergency care. For example, call if:    · You passed out (lost consciousness).     · You have severe trouble breathing.     · You have sudden chest pain and shortness of breath, or you cough up blood. Call your doctor now or seek immediate medical care if:    · You have pain that does not get better after you take pain medicine.     · Your fingers or toes on the injured arm or leg are cool, pale, or change color.     · You have tingling or numbness in your fingers or toes.     · You cannot move your fingers or toes.     · Your cast or splint feels too tight.     · The skin under your cast or splint is burning or stinging.     · You have signs of infection, such as:  ? Increased pain, swelling, warmth, or redness. ? Red streaks leading from the incision. ? Pus draining from the incision. ?  A fever.     · You have drainage or a bad smell coming from the cast or splint.     · You have signs of a blood clot, such as:  ? Pain in your calf, back of the knee, thigh, or groin. ? Redness and swelling in your leg or groin.     · You have new or worse nausea or vomiting.     · You are too sick to your stomach to drink any fluids.     · You cannot keep down fluids. Watch closely for any changes in your health, and be sure to contact your doctor if:    · You have any problems with your cast or splint. Where can you learn more? Go to http://www.gray.com/  Enter Z505 in the search box to learn more about \"Open Reduction With Internal Fixation of a Limb: What to Expect at Home. \"  Current as of: July 1, 2021               Content Version: 13.2  © 2006-2022 [employer]. Care instructions adapted under license by [employer] (which disclaims liability or warranty for this information). If you have questions about a medical condition or this instruction, always ask your healthcare professional. [place] [address] any warranty or liability for your use of this information. DISCHARGE SUMMARY from Nurse    PATIENT INSTRUCTIONS:    After general anesthesia or intravenous sedation, for 24 hours or while taking prescription Narcotics:  · Limit your activities  · Do not drive and operate hazardous machinery  · Do not make important personal or business decisions  · Do  not drink alcoholic beverages  · If you have not urinated within 8 hours after discharge, please contact your surgeon on call.     Report the following to your surgeon:  · Excessive pain, swelling, redness or odor of or around the surgical area  · Temperature over 100.5  · Nausea and vomiting lasting longer than 4 hours or if unable to take medications  · Any signs of decreased circulation or nerve impairment to extremity: change in color, persistent  numbness, tingling, coldness or increase pain  · Any questions    What to do at Home:      *  Please give a list of your current medications to your Primary Care Provider. *  Please update this list whenever your medications are discontinued, doses are      changed, or new medications (including over-the-counter products) are added. *  Please carry medication information at all times in case of emergency situations. These are general instructions for a healthy lifestyle:    No smoking/ No tobacco products/ Avoid exposure to second hand smoke  Surgeon General's Warning:  Quitting smoking now greatly reduces serious risk to your health. Obesity, smoking, and sedentary lifestyle greatly increases your risk for illness    A healthy diet, regular physical exercise & weight monitoring are important for maintaining a healthy lifestyle    You may be retaining fluid if you have a history of heart failure or if you experience any of the following symptoms:  Weight gain of 3 pounds or more overnight or 5 pounds in a week, increased swelling in our hands or feet or shortness of breath while lying flat in bed. Please call your doctor as soon as you notice any of these symptoms; do not wait until your next office visit. The discharge information has been reviewed with the patient. The patient verbalized understanding. Discharge medications reviewed with the patient and appropriate educational materials and side effects teaching were provided.   ___________________________________________________________________________________________________________________________________

## 2022-04-12 NOTE — ANESTHESIA PREPROCEDURE EVALUATION
Relevant Problems   No relevant active problems       Anesthetic History   No history of anesthetic complications            Review of Systems / Medical History  Patient summary reviewed and pertinent labs reviewed    Pulmonary  Within defined limits                 Neuro/Psych   Within defined limits           Cardiovascular                  Exercise tolerance: >4 METS     GI/Hepatic/Renal  Within defined limits              Endo/Other  Within defined limits           Other Findings              Physical Exam    Airway  Mallampati: II  TM Distance: 4 - 6 cm  Neck ROM: normal range of motion   Mouth opening: Normal     Cardiovascular  Regular rate and rhythm,  S1 and S2 normal,  no murmur, click, rub, or gallop             Dental  No notable dental hx       Pulmonary  Breath sounds clear to auscultation               Abdominal  GI exam deferred       Other Findings            Anesthetic Plan    ASA: 1  Anesthesia type: general and regional - supraclavicular block          Induction: Intravenous  Anesthetic plan and risks discussed with: Patient

## 2022-04-25 ENCOUNTER — OFFICE VISIT (OUTPATIENT)
Dept: ORTHOPEDIC SURGERY | Age: 42
End: 2022-04-25
Payer: COMMERCIAL

## 2022-04-25 VITALS
OXYGEN SATURATION: 98 % | HEART RATE: 99 BPM | RESPIRATION RATE: 16 BRPM | BODY MASS INDEX: 29.02 KG/M2 | TEMPERATURE: 96.8 F | WEIGHT: 174.2 LBS | HEIGHT: 65 IN

## 2022-04-25 DIAGNOSIS — Z98.890 S/P ORIF (OPEN REDUCTION INTERNAL FIXATION) FRACTURE: ICD-10-CM

## 2022-04-25 DIAGNOSIS — Z87.81 S/P ORIF (OPEN REDUCTION INTERNAL FIXATION) FRACTURE: ICD-10-CM

## 2022-04-25 DIAGNOSIS — S52.572A OTHER CLOSED INTRA-ARTICULAR FRACTURE OF DISTAL END OF LEFT RADIUS, INITIAL ENCOUNTER: Primary | ICD-10-CM

## 2022-04-25 PROCEDURE — 73110 X-RAY EXAM OF WRIST: CPT | Performed by: ORTHOPAEDIC SURGERY

## 2022-04-25 PROCEDURE — 99024 POSTOP FOLLOW-UP VISIT: CPT | Performed by: ORTHOPAEDIC SURGERY
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
## 2022-04-27 ENCOUNTER — HOSPITAL ENCOUNTER (OUTPATIENT)
Dept: PHYSICAL THERAPY | Age: 42
Discharge: HOME | End: 2022-04-27
Payer: COMMERCIAL

## 2022-04-27 PROCEDURE — 97165 OT EVAL LOW COMPLEX 30 MIN: CPT

## 2022-04-27 PROCEDURE — 97535 SELF CARE MNGMENT TRAINING: CPT

## 2022-04-27 PROCEDURE — 97110 THERAPEUTIC EXERCISES: CPT
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
## 2022-04-29 ENCOUNTER — HOSPITAL ENCOUNTER (OUTPATIENT)
Dept: PHYSICAL THERAPY | Age: 42
Discharge: HOME | End: 2022-04-29
Payer: COMMERCIAL

## 2022-04-29 PROCEDURE — 97110 THERAPEUTIC EXERCISES: CPT

## 2022-04-29 PROCEDURE — 97140 MANUAL THERAPY 1/> REGIONS: CPT

## 2022-04-29 PROCEDURE — 97530 THERAPEUTIC ACTIVITIES: CPT

## 2022-04-29 NOTE — PROGRESS NOTES
OT DAILY TREATMENT NOTE     Patient Name: Bere Alicea  Date:2022  : 1980  [x]  Patient  Verified  Payor: Charlene Ferraro / Plan: Kiesha Bianchi / Product Type: HMO /    In time:1:50 PM  Out time:2:34 PM  Total Treatment Time (min): 44  Visit #: 2 of 12    Medicare/BCBS Only   Total Timed Codes (min):  44 1:1 Treatment Time:  44     Treatment Area: Other intraarticular fracture of lower end of left radius, initial encounter for closed fracture [S52.572A]  Other specified postprocedural states [Z98.890]  Personal history of (healed) traumatic fracture [Z87.81]    SUBJECTIVE  Pain Level (0-10 scale): 1/10  Any medication changes, allergies to medications, adverse drug reactions, diagnosis change, or new procedure performed?: [x] No    [] Yes (see summary sheet for update)  Subjective functional status/changes:   [] No changes reported  \"I bought a hot pack and I can move my finger a little more. \"    OBJECTIVE    Modality rationale: decrease pain and increase tissue extensibility to improve the patients ability to move left wrist    Min Type Additional Details    [] Estim:  []Unatt       []IFC  []Premod                        []Other:  []w/ice   []w/heat  Position:  Location:    [] Estim: []Att    []TENS instruct  []NMES                    []Other:  []w/US   []w/ice   []w/heat  Position:  Location:    []  Traction: [] Cervical       []Lumbar                       [] Prone          []Supine                       []Intermittent   []Continuous Lbs:  [] before manual  [] after manual    []  Ultrasound: []Continuous   [] Pulsed                           []1MHz   []3MHz W/cm2:  Location:    []  Iontophoresis with dexamethasone         Location: [] Take home patch   [] In clinic   5 concurrent with self care []  Ice     [x]  Heat MHP  []  Ice massage  []  Laser   []  Paraffin Position: seated, resting  Location: left wrist    []  Laser with stim  []  Other:  Position:  Location:    []  Vasopneumatic Device []  Right     []  Left  Pre-treatment girth:  Post-treatment girth:  Measured at (location):  Pressure:       [] lo [] med [] hi   Temperature: [] lo [] med [] hi       [x] Skin assessment post-treatment:  [x]intact [x]redness- no adverse reaction    []redness - adverse reaction:     9 min Therapeutic Exercise:  [x] See flow sheet :   Rationale: increase ROM to improve the patients ability to move right wrist for functional tasks. 15 min Therapeutic Activity:  [x]  See flow sheet :   Rationale: increase ROM and improve coordination  to improve the patients ability to manipulate items using left hand. 10 min Manual Therapy:  IASTM using tool #5 and 6 using sweeping and fanning techniques    The manual therapy interventions were performed at a separate and distinct time from the therapeutic activities interventions. Rationale: increase tissue extensibility and decrease edema  to dorsal left wrist, hand and digits. 10 min Self Care/Home Management: IASTM, edema mgmt, moist heat application, wound mgmt, activity modifications   Rationale: education  to improve the patients ability to promote healing of left UE ad improve functional use of left hand for daily tasks. With   [] TE   [] TA   [] neuro   [] other: Patient Education: [x] Review HEP    [] Progressed/Changed HEP based on:   [] positioning   [] body mechanics   [] transfers   [] heat/ice application   [] Splint wear/care   [] Sensory re-education   [] scar management      [] other:            Other Objective/Functional Measures:   Edema: GIRTH CHART measured in cm  Date: 4/27/2022 4/29/2022     Side Right/Left Left    DPC circum. 21.7/21.1 21.2    Wrist Crease 16.7/18.5  18.4   FA        Elbow            Pain Level (0-10 scale) post treatment: 0/10    ASSESSMENT/Changes in Function: Pt presents without compression garment donned.  Educated on compression garment wear, elevation, HEP and massage to reduce edema in left hand digits and wrist.  Pt benefits from encouragement to perform wrist AROM activities. Good effort and active participation with all tasks. Initiated IASTM to dorsal hand and wrist and patient reported no difficulty with this. Reduced pain reported at end of treatment session. Patient will continue to benefit from skilled OT services to modify and progress therapeutic interventions, address ROM deficits, address strength deficits, analyze and address soft tissue restrictions and instruct in home and community integration to attain remaining goals. []  See Plan of Care  []  See progress note/recertification  []  See Discharge Summary         Progress towards goals / Updated goals:  Short Term Goals: To be accomplished in 2  weeks:  Goal:* Patient will be compliant with initial home exercise program to take an active role in their rehabilitation process. Status at Eval: Patient was provided with a basic home exercise program including wrist and digit AROM.     Goal:* Patient will demonstrate a good understanding of their condition and strategies for self-management. Status at Eval: pt educated on prognosis, diagnosis, treatment plan, precautions, splint wear, edema mgmt     Long Term Goals: To be accomplished in 4 weeks:              Goal:* Patient will have 20 degrees of left wrist extension in order to increase ease with ADL's such as bathing, eating and dressing and to eventually bear weight thru the left upper extremity as in pushing up from a chair. Status at Eval: -5 deg from neutral wrist      Goal:* Patient will have 45 degrees of left wrist flexion in order to perform hygiene tasks and /or work with tools such as a screwdriver. Status at Eval: 25 deg      Goal:* Patient will have 35 degrees of left forearm supination in order to perform ADL's including washing face and accepting change.    Status at Eval: 18 deg                Goal:*Patient will regain 50 degrees flexion of the left thumb to enable grasp of cylindrical objects such as a glass, handle or toothbrush. Status at Eval:16 deg MCP jt flexion     Goal:*Patient will attain 10 degrees or less of left  thumb extension to enable him/ her to grab and carry a cylindrical object. Status at Eval: 14 deg extension lag     Goal:* Patient will demonstrate a 0.5 cm decrease in edema of the left wrist in order to improve participation with grooming and dressing tasks. Status at Eval: left wrist circum - 18.5 cm      Goal:* Patient will show a 15 point improvement on FOTO functional status measure to improve overall functional performance.   Status at Eval: 35    PLAN  []  Upgrade activities as tolerated     [x]  Continue plan of care  []  Update interventions per flow sheet       []  Discharge due to:_  []  Other:_      Freedom Andres OT 4/29/2022  1:53 PM    Future Appointments   Date Time Provider Prabhakar Trivedi   5/2/2022 10:00 AM MARA Bahena [facility]   5/4/2022 10:00 AM Gregory Michaels MARA [facility]   5/6/2022 11:30 AM Sae Meyers OT [facility]   5/9/2022 10:45 AM Gregory Michaels MARA [facility]   5/11/2022 10:00 AM Gregory Michaels MARA [facility]   5/13/2022 10:00 AM Sae Meyers OT [facility]   5/16/2022 10:00 AM Sae Meyers OT [facility]   5/18/2022 10:00 AM Gregory Michaels MARA [facility]   6/6/2022 11:10 AM Félix Colin,  [facility]

## 2022-05-02 ENCOUNTER — HOSPITAL ENCOUNTER (OUTPATIENT)
Dept: PHYSICAL THERAPY | Age: 42
Discharge: HOME | End: 2022-05-02
Payer: COMMERCIAL

## 2022-05-02 PROCEDURE — 97110 THERAPEUTIC EXERCISES: CPT

## 2022-05-02 PROCEDURE — 97535 SELF CARE MNGMENT TRAINING: CPT

## 2022-05-02 PROCEDURE — 97140 MANUAL THERAPY 1/> REGIONS: CPT

## 2022-05-02 NOTE — PROGRESS NOTES
OT DAILY TREATMENT NOTE     Patient Name: Shea Day  Date:2022  : 1980  [x]  Patient  Verified  Payor: Sujatha Vogt / Plan: Bimal Ramires / Product Type: HMO /    In time: 10:15  Out time:10:45   Total Treatment Time (min): 30  Visit #: 3 of 12    Medicare/BCBS Only   Total Timed Codes (min):  30 1:1 Treatment Time:  30     Treatment Area: Other intraarticular fracture of lower end of left radius, initial encounter for closed fracture [S52.572A]  Other specified postprocedural states [Z98.890]  Personal history of (healed) traumatic fracture [Z87.81]    SUBJECTIVE  Pain Level (0-10 scale): 1/10   Any medication changes, allergies to medications, adverse drug reactions, diagnosis change, or new procedure performed?: [x] No    [] Yes (see summary sheet for update)  Subjective functional status/changes:   [] No changes reported    \"I was late because I was at another appointment\"  \"I haven't been wearing my compression sleeve, it gets tight\"    OBJECTIVE    Modality rationale: decrease pain and increase tissue extensibility to improve the patients ability to move left wrist    Min Type Additional Details      []? Estim:  []? Unatt       []? IFC  []? Premod                        []?Other:  []?w/ice   []?w/heat  Position:  Location:      []? Estim: []? Att    []? TENS instruct  []? NMES                    []?Other:  []?w/US   []?w/ice   []?w/heat  Position:  Location:      []? Traction: []? Cervical       []? Lumbar                       []? Prone          []? Supine                       []?Intermittent   []? Continuous Lbs:  []? before manual  []? after manual      []? Ultrasound: []? Continuous   []? Pulsed                           []? 1MHz   []? 3MHz W/cm2:  Location:      []? Iontophoresis with dexamethasone         Location: []? Take home patch   []? In clinic    5 concurrent with self care []? Ice     [x]? Heat MHP  []? Ice massage  []? Laser   []?   Paraffin Position: seated, resting  Location: left wrist      []? Laser with stim  []? Other:  Position:  Location:      []? Vasopneumatic Device    []? Right     []? Left  Pre-treatment girth:  Post-treatment girth:  Measured at (location):  Pressure:       []? lo []? med []? hi   Temperature: []? lo []? med []? hi          [x]? Skin assessment post-treatment:  [x]? intact [x]? redness- no adverse reaction    []? redness - adverse reaction:     10 min Therapeutic Exercise:  [] See flow sheet :   Rationale: increase ROM to improve the patients ability to move right wrist for functional tasks. Left hand/wrist:    Wrist AROM HEP. Digit blocking HEP   Tendon glides   Towel scrunches      10 min Manual Therapy:  IASTM using tool #5 and 6 using sweeping and fanning techniques    The manual therapy interventions were performed at a separate and distinct time from the therapeutic activities interventions. Rationale: increase tissue extensibility and decrease edema  to dorsal left wrist, hand and digits. 10 min Self Care/Home Management: edema mgmt, moist heat,  activity modifications. Rationale: education  to improve the patients ability to promote healing of left UE ad improve functional use of left hand for daily tasks. With   [] TE   [] TA   [] neuro   [] other: Patient Education: [x] Review HEP    [x] Progressed/Changed HEP based on: min cues for proper positioning with tendon glide and wrist AROMHEP. [] positioning   [] body mechanics   [] transfers   [] heat/ice application   [] Splint wear/care   [] Sensory re-education   [] scar management      [] other:            Other Objective/Functional Measures:      Shortened session due to late arrival   Guarding left UE when arrived at session.     Pain Level (0-10 scale) post treatment: 1/10     ASSESSMENT/Changes in Function: no increase in pain with wrist and digit ROM exercises, difficulty performing wrist and digit ROM exercises due to increased stiffness, pt educated on edema management to improve ROM , pt not compliant with edema compression sleeve at this time, pt is utilizing massage and elevation per pt report. pt benefits from education on self-management strategies. Patient will continue to benefit from skilled OT services to address ROM deficits, address strength deficits, analyze and address soft tissue restrictions and analyze and modify body mechanics/ergonomics to attain remaining goals. [x]  See Plan of Care  []  See progress note/recertification  []  See Discharge Summary         Progress towards goals / Updated goals:    Short Term Goals: To be accomplished in 2  weeks:  Goal:* Patient will be compliant with initial home exercise program to take an active role in their rehabilitation process. Status at Eval: Patient was provided with a basic home exercise program including wrist and digit AROM.     Goal:* Patient will demonstrate a good understanding of their condition and strategies for self-management. Status at Eval: pt educated on prognosis, diagnosis, treatment plan, precautions, splint wear, edema mgmt     Long Term Goals: To be accomplished in 4 weeks:              Goal:* Patient will have 20 degrees of left wrist extension in order to increase ease with ADL's such as bathing, eating and dressing and to eventually bear weight thru the left upper extremity as in pushing up from a chair. Status at Eval: -5 deg from neutral wrist      Goal:* Patient will have 45 degrees of left wrist flexion in order to perform hygiene tasks and /or work with tools such as a screwdriver. Status at Eval: 25 deg      Goal:* Patient will have 35 degrees of left forearm supination in order to perform ADL's including washing face and accepting change. Status at Eval: 18 deg                Goal:*Patient will regain 50 degrees flexion of the left thumb to enable grasp of cylindrical objects such as a glass, handle or toothbrush.   Status at Eval:16 deg MCP jt flexion     Goal:*Patient will attain 10 degrees or less of left  thumb extension to enable him/ her to grab and carry a cylindrical object. Status at Eval: 14 deg extension lag     Goal:* Patient will demonstrate a 0.5 cm decrease in edema of the left wrist in order to improve participation with grooming and dressing tasks. Status at Eval: left wrist circum - 18.5 cm      Goal:* Patient will show a 15 point improvement on FOTO functional status measure to improve overall functional performance.   Status at Eval: 35     PLAN  []  Upgrade activities as tolerated     [x]  Continue plan of care  []  Update interventions per flow sheet       []  Discharge due to:_  []  Other:_      MARA Flores 5/2/2022  9:58 AM    Future Appointments   Date Time Provider Prabhakar Trivedi   5/2/2022 10:00 AM MARA Young [facility]   5/4/2022 10:00 AM MARA Young [facility]   5/6/2022 11:30 AM MARA Young [facility]   5/9/2022 10:45 AM MARA Yuong [facility]   5/11/2022 10:00 AM MARA Young [facility]   5/13/2022 10:00 AM Alfredo Burnett OT [facility]   5/16/2022 10:00 AM Alfredo Burnett OT [facility]   5/18/2022 10:00 AM MARA Young [facility]   6/6/2022 11:10 AM Félix Colin DO [facility]

## 2022-05-04 ENCOUNTER — HOSPITAL ENCOUNTER (OUTPATIENT)
Dept: PHYSICAL THERAPY | Age: 42
Discharge: HOME | End: 2022-05-04
Payer: COMMERCIAL

## 2022-05-04 PROCEDURE — 97110 THERAPEUTIC EXERCISES: CPT

## 2022-05-04 PROCEDURE — 97140 MANUAL THERAPY 1/> REGIONS: CPT

## 2022-05-04 PROCEDURE — 97535 SELF CARE MNGMENT TRAINING: CPT

## 2022-05-04 NOTE — PROGRESS NOTES
OT DAILY TREATMENT NOTE     Patient Name: Bere Alicea  Date:2022  : 1980  [x]  Patient  Verified  Payor: Charlene Ferraro / Plan: Kiesha Bianchi / Product Type: HMO /    In time: 10:02  Out time: 10:45  Total Treatment Time (min): 43  Visit #: 4 of 12    Medicare/BCBS Only   Total Timed Codes (min):  43 1:1 Treatment Time:  43     Treatment Area: Other intraarticular fracture of lower end of left radius, initial encounter for closed fracture [S52.572A]  Other specified postprocedural states [Z98.890]  Personal history of (healed) traumatic fracture [Z87.81]    SUBJECTIVE  Pain Level (0-10 scale): 1/10  Any medication changes, allergies to medications, adverse drug reactions, diagnosis change, or new procedure performed?: [x] No    [] Yes (see summary sheet for update)  Subjective functional status/changes:   [] No changes reported    \"I had some more pain this morning, but it feels okay now\"    OBJECTIVE    Modality rationale: decrease pain and increase tissue extensibility to improve the patients ability to move left wrist    Min Type Additional Details       []? ? Estim:  []?? Unatt       []? ?IFC  []? ?Premod                        []? ? Other:  []??w/ice   []? ?w/heat  Position:  Location:       []? ? Estim: []??Att    []? ?TENS instruct  []? ?NMES                    []? ? Other:  []??w/US   []? ?w/ice   []? ?w/heat  Position:  Location:       []? ?  Traction: []?? Cervical       []? ?Lumbar                       []? ? Prone          []? ?Supine                       []? ?Intermittent   []? ? Continuous Lbs:  []?? before manual  []? ? after manual       []? ?  Ultrasound: []??Continuous   []? ? Pulsed                           []? ?1MHz   []? ? 3MHz W/cm2:  Location:       []? ?  Iontophoresis with dexamethasone         Location: []?? Take home patch   []? ? In clinic     5 concurrent with self care []? ?  Ice     [x]? ?  Heat MHP  []??  Ice massage  []? ?  Laser   []? ?  Paraffin Position: seated, resting  Location: left wrist       []? ?  Laser with stim  []? ?  Other:  Position:  Location:       []? ?  Vasopneumatic Device    []? ?  Right     []? ?  Left  Pre-treatment girth:  Post-treatment girth:  Measured at (location):  Pressure:       []? ? lo []? ? med []?? hi   Temperature: []?? lo []? ? med []?? hi        [x]? ? Skin assessment post-treatment:  [x]? ? intact [x]? ? redness- no adverse reaction    []? ?redness - adverse reaction:     23 min Therapeutic Exercise:  [] See flow sheet :   Rationale: increase ROM to improve the patients ability to move right wrist for functional tasks.      Left hand/wrist:    Towel scrunches   Digit jt Blocking   Tendon glides   Ball rolls   Opposition with foam pieces   Digit PROM DIP and PIP flex by HOLLAND     10 min Manual Therapy:  IASTM using tool # 6 using sweeping and fanning techniques    The manual therapy interventions were performed at a separate and distinct time from the therapeutic activities interventions. Rationale: increase tissue extensibility and decrease edema  to dorsal left wrist, hand and digits.     10 min Self Care/Home Management: Edema management , scar management. Rationale: patient education   to improve the patients ability to manage symptoms. With   [] TE   [] TA   [] neuro   [] other: Patient Education: [x] Review HEP    [x] Progressed/Changed HEP based on: independent with tendon glides. [] positioning   [] body mechanics   [] transfers   [] heat/ice application   [] Splint wear/care   [] Sensory re-education   [] scar management      [] other:            Other Objective/Functional Measures:     Pt tolerated all wrist AROM and digit AROM without pain. Pain Level (0-10 scale) post treatment: 1/10    ASSESSMENT/Changes in Function: decreased stiffness with heat and massage modalities, no increase in pain with digit and wrist ROM exercises, pt benefits from education on precautions and activity modification.      Patient will continue to benefit from skilled OT services to address ROM deficits, address strength deficits, analyze and address soft tissue restrictions and analyze and modify body mechanics/ergonomics to attain remaining goals. [x]  See Plan of Care  []  See progress note/recertification  []  See Discharge Summary         Progress towards goals / Updated goals:    Short Term Goals: To be accomplished in 2  weeks:  Goal:* Patient will be compliant with initial home exercise program to take an active role in their rehabilitation process. Status at Eval: Patient was provided with a basic home exercise program including wrist and digit AROM.     Goal:* Patient will demonstrate a good understanding of their condition and strategies for self-management. Status at Eval: pt educated on prognosis, diagnosis, treatment plan, precautions, splint wear, edema mgmt     Long Term Goals: To be accomplished in 4 weeks:              Goal:* Patient will have 20 degrees of left wrist extension in order to increase ease with ADL's such as bathing, eating and dressing and to eventually bear weight thru the left upper extremity as in pushing up from a chair. Status at Eval: -5 deg from neutral wrist      Goal:* Patient will have 45 degrees of left wrist flexion in order to perform hygiene tasks and /or work with tools such as a screwdriver. Status at Eval: 25 deg      Goal:* Patient will have 35 degrees of left forearm supination in order to perform ADL's including washing face and accepting change. Status at Eval: 18 deg                Goal:*Patient will regain 50 degrees flexion of the left thumb to enable grasp of cylindrical objects such as a glass, handle or toothbrush. Status at Eval:16 deg MCP jt flexion     Goal:*Patient will attain 10 degrees or less of left  thumb extension to enable him/ her to grab and carry a cylindrical object.   Status at Eval: 14 deg extension lag     Goal:* Patient will demonstrate a 0.5 cm decrease in edema of the left wrist in order to improve participation with grooming and dressing tasks. Status at Eval: left wrist circum - 18.5 cm      Goal:* Patient will show a 15 point improvement on FOTO functional status measure to improve overall functional performance.   Status at Eval: 35    PLAN  []  Upgrade activities as tolerated     [x]  Continue plan of care  []  Update interventions per flow sheet       []  Discharge due to:_  []  Other:_      MARA Valero 5/4/2022  10:05 AM    Future Appointments   Date Time Provider Prabhakar Trivedi   5/6/2022 11:30 AM MARA Dover [facility]   5/9/2022 10:45 AM MARA Dover [facility]   5/11/2022 10:00 AM MARA Dover [facility]   5/13/2022 10:00 AM Josefa Arita, OT [facility]   5/16/2022 10:00 AM Josefa Arita, OT [facility]   5/18/2022 10:00 AM MARA Dover [facility]   6/6/2022 11:10 AM Félix Colin, DO [facility]

## 2022-05-06 ENCOUNTER — HOSPITAL ENCOUNTER (OUTPATIENT)
Dept: PHYSICAL THERAPY | Age: 42
Discharge: HOME | End: 2022-05-06
Payer: COMMERCIAL

## 2022-05-06 PROCEDURE — 97110 THERAPEUTIC EXERCISES: CPT

## 2022-05-06 PROCEDURE — 97140 MANUAL THERAPY 1/> REGIONS: CPT

## 2022-05-06 PROCEDURE — 97535 SELF CARE MNGMENT TRAINING: CPT

## 2022-05-06 NOTE — PROGRESS NOTES
OT DAILY TREATMENT NOTE     Patient Name: Shea Day  Date:2022  : 1980  [x]  Patient  Verified  Payor: Sujatha Vogt / Plan: Bimal Ramires / Product Type: HMO /    In time: 11:28  Out time: 12:10   Total Treatment Time (min): 42  Visit #: 5 of 12    Medicare/BCBS Only   Total Timed Codes (min):  42 1:1 Treatment Time:  42     Treatment Area: Other intraarticular fracture of lower end of left radius, initial encounter for closed fracture [S52.572A]  Other specified postprocedural states [Z98.890]  Personal history of (healed) traumatic fracture [Z87.81]    SUBJECTIVE  Pain Level (0-10 scale): 1/10  Any medication changes, allergies to medications, adverse drug reactions, diagnosis change, or new procedure performed?: [x] No    [] Yes (see summary sheet for update)  Subjective functional status/changes:   [] No changes reported    \"wrist is feeling okay\"   \"I can wear the sleeve now\"    OBJECTIVE    Modality rationale: decrease pain and increase tissue extensibility to improve the patients ability to move left wrist    Min Type Additional Details       []??? Estim:  []? ??Unatt       []? ??IFC  []? ? ?Premod                        []? ??Other:  []???w/ice   []? ??w/heat  Position:  Location:       []??? Estim: []? ? ? Att    []? ??TENS instruct  []? ??NMES                    []? ??Other:  []???w/US   []? ??w/ice   []? ??w/heat  Position:  Location:       []? ??  Traction: []??? Cervical       []? ? ?Lumbar                       []? ?? Prone          []? ? ?Supine                       []? ? ? Intermittent   []? ?? Continuous Lbs:  []??? before manual  []? ?? after manual       []? ??  Ultrasound: []? ? ? Continuous   []? ?? Pulsed                           []? ??1MHz   []? ??3MHz W/cm2:  Location:       []? ??  Iontophoresis with dexamethasone         Location: []??? Take home patch   []??? In clinic     5 concurrent with self care []? ??  Ice     [x]? ??  Heat MHP  []???  Ice massage  []? ??  Laser   []? ??  Paraffin Position: seated, resting  Location: left wrist       []? ??  Laser with stim  []? ??  Other:  Position:  Location:       []? ??  Vasopneumatic Device    []? ??  Right     []? ??  Left  Pre-treatment girth:  Post-treatment girth:  Measured at (location):  Pressure:       []? ?? lo []? ?? med []? ?? hi   Temperature: []??? lo []? ?? med []? ?? hi        [x]? ?? Skin assessment post-treatment:  [x]? ??intact [x]? ??redness- no adverse reaction    []? ??redness - adverse reaction:     22 min Therapeutic Exercise:  [] See flow sheet :   Rationale: increase ROM to improve the patients ability to move right wrist for functional tasks.      Left hand/wrist:    Wrist AROM HEP   Ball rolls   Tendon glides   Digit blocking   Towel scrunches   Opposition with marbles   Abd/add with marbles     10 min Manual Therapy:  IASTM using tool # 6 using sweeping and fanning techniques    The manual therapy interventions were performed at a separate and distinct time from the therapeutic activities interventions. Rationale: increase tissue extensibility and decrease edema to dorsal left wrist, hand and digits.       10 min Self Care/Home Management: Edema management , scar management. Rationale: patient education   to improve the patients ability to manage symptoms. With   [] TE   [] TA   [] neuro   [] other: Patient Education: [x] Review HEP    [x] Progressed/Changed HEP based on: min cues for proper positioning with tendon glides.      [] positioning   [] body mechanics   [] transfers   [] heat/ice application   [] Splint wear/care   [] Sensory re-education   [] scar management      [] other:            Other Objective/Functional Measures:     Range of Motion:     Elbow/Wrist   Wrist 4/27/2022  Left  5/6/2022  Left          Flex 25  30 (+5)         Ext -5 28 ( +33)         UD 7 10 (+3)         RD 5  14 (+9)         FA              Pro 80  90(+10)         Sup 18  30 ( +12)            Status at Eval: left wrist circum - 18.5 cm    5/6/2022: left wrist 19.2cm    Pain Level (0-10 scale) post treatment: 0-1/10    ASSESSMENT/Changes in Function: increased wrist edema circumference, improving wrist ROM, decreased pain and stiffness with heat and massage modalities. Patient will continue to benefit from skilled OT services to address ROM deficits, address strength deficits, analyze and address soft tissue restrictions and analyze and modify body mechanics/ergonomics to attain remaining goals. [x]  See Plan of Care  []  See progress note/recertification  []  See Discharge Summary         Progress towards goals / Updated goals:    Short Term Goals: To be accomplished in 2  weeks:  Goal:* Patient will be compliant with initial home exercise program to take an active role in their rehabilitation process. Status at [facility]: Patient was provided with a basic home exercise program including wrist and digit AROM.  5/6/22: min cues for proper positioning with tendon glides, independent with blocking exercises.      Goal:* Patient will demonstrate a good understanding of their condition and strategies for self-management. Status at [facility]: pt educated on prognosis, diagnosis, treatment plan, precautions, splint wear, edema mgmt  5/6/22: compliant with edema massage strategies and compression sleeve wear schedule.      Long Term Goals: To be accomplished in 4 weeks:              Goal:* Patient will have 20 degrees of left wrist extension in order to increase ease with ADL's such as bathing, eating and dressing and to eventually bear weight thru the left upper extremity as in pushing up from a chair. Status at [facility]: -5 deg from neutral wrist  5/6/2022: 28 deg       Goal:* Patient will have 45 degrees of left wrist flexion in order to perform hygiene tasks and /or work with tools such as a screwdriver.   Status at [facility]: 25 deg  5/6/2022: 30 deg       Goal:* Patient will have 35 degrees of left forearm supination in order to perform ADL's including washing face and accepting change. Status at Eval: 18 deg  5/6/2022: 30 deg                 Goal:*Patient will regain 50 degrees flexion of the left thumb to enable grasp of cylindrical objects such as a glass, handle or toothbrush. Status at Eval:16 deg MCP jt flexion     Goal:*Patient will attain 10 degrees or less of left  thumb extension to enable him/ her to grab and carry a cylindrical object. Status at Eval: 14 deg extension lag     Goal:* Patient will demonstrate a 0.5 cm decrease in edema of the left wrist in order to improve participation with grooming and dressing tasks. Status at Eval: left wrist circum - 18.5 cm    5/6/2022:      Goal:* Patient will show a 15 point improvement on FOTO functional status measure to improve overall functional performance.   Status at Eval: 35    PLAN  []  Upgrade activities as tolerated     [x]  Continue plan of care  []  Update interventions per flow sheet        []  Discharge due to:_  []  Other:_      MARA Alcantar 5/6/2022  11:32 AM    Future Appointments   Date Time Provider Prabhakar Trivedi   5[facility]2022 10[facility]45 [facility]   5/11/2022 10:00 AM MARA Grajeda [facility]   5/13/2022 10:00 AM Heather Vanessa OT [facility]   5/16/2022 10:00 AM Heather Vanessa OT [facility]   5/18/2022 10:00 AM MARA Grajeda [facility]   6/6/2022 11:10 AM Félix Colin DO [facility]

## 2022-05-09 ENCOUNTER — HOSPITAL ENCOUNTER (OUTPATIENT)
Dept: PHYSICAL THERAPY | Age: 42
Discharge: HOME | End: 2022-05-09
Payer: COMMERCIAL

## 2022-05-09 PROCEDURE — 97140 MANUAL THERAPY 1/> REGIONS: CPT

## 2022-05-09 PROCEDURE — 97530 THERAPEUTIC ACTIVITIES: CPT

## 2022-05-09 PROCEDURE — 97110 THERAPEUTIC EXERCISES: CPT

## 2022-05-09 NOTE — PROGRESS NOTES
OT DAILY TREATMENT NOTE     Patient Name: Paul Dee  Date:2022  : 1980  [x]  Patient  Verified  Payor: Miles Joseph / Plan: See Feliciano / Product Type: HMO /    In time: 10:46  Out time: 10:30  Total Treatment Time (min): 44  Visit #: 6 of 12    Treatment Area: Other intraarticular fracture of lower end of left radius, initial encounter for closed fracture [S52.592A]  Other specified postprocedural states [Z98.890]  Personal history of (healed) traumatic fracture [Z87.81]    SUBJECTIVE  Pain Level (0-10 scale): 1/10  Any medication changes, allergies to medications, adverse drug reactions, diagnosis change, or new procedure performed?: [x] No    [] Yes (see summary sheet for update)  Subjective functional status/changes:   [] No changes reported    \"my wrist is getting smaller\"   \"I got my daughter to massage my hand and wrist the other day\"    OBJECTIVE    Modality rationale: decrease pain and increase tissue extensibility to improve the patients ability to move left wrist    Min Type Additional Details       []???? Estim:  []????Unatt       []????IFC  []????Premod                        []?? ??Other:  []????w/ice   []? ???w/heat  Position:  Location:       []???? Estim: []?? ?? Att    []????TENS instruct  []????NMES                    []?? ??Other:  []????w/US   []? ???w/ice   []? ???w/heat  Position:  Location:       []????  Traction: []???? Cervical       []????Lumbar                       []???? Prone          []????Supine                       []?? ?? Intermittent   []???? Continuous Lbs:  []???? before manual  []???? after manual       []????  Ultrasound: []???? Continuous   []???? Pulsed                           []????1MHz   []????3MHz W/cm2:  Location:       []????  Iontophoresis with dexamethasone         Location: []???? Take home patch   []???? In clinic     5 concurrent with self care []????  Ice     [x]? ???  Heat MHP  []????  Ice massage  []????  Laser   []????  Paraffin Position: seated, resting  Location: left wrist       []????  Laser with stim  []????  Other:  Position:  Location:       []????  Vasopneumatic Device    []????  Right     []????  Left  Pre-treatment girth:  Post-treatment girth:  Measured at (location):  Pressure:       []???? lo []???? med []???? hi   Temperature: []???? lo []???? med []???? hi        [x]? ??? Skin assessment post-treatment:  [x]? ???intact [x]? ???redness- no adverse reaction    []? ???redness - adverse reaction:    10 min Therapeutic Exercise:  [] See flow sheet :   Rationale: increase ROM to improve the patients ability to move right wrist for functional tasks.      Left hand/wrist:    Wrist AROM   Tendon glides   Card fanning thumb ROM    14 min Therapeutic Activity:  []  See flow sheet :   Rationale: increase ROM and improve coordination  to improve the patients ability to manipulate small items. Left hand:    Opposition with marbles   Abd/add with marbles   Small dexterity balls - tabletop       10 min Manual Therapy:  IASTM using tool # 6 using sweeping and fanning techniques    The manual therapy interventions were performed at a separate and distinct time from the therapeutic activities interventions. Rationale: increase tissue extensibility and decrease edema to dorsal left wrist, hand and digits.         10 min Self Care/Home Management: Edema management ,  activity modifications. Rationale: patient education   to improve the patients ability to manage symptoms.       With   [] TE   [] TA   [] neuro   [] other: Patient Education: [x] Review HEP    [x] Progressed/Changed HEP based on: independent with wrist ROM and tendon glide HEP. [] positioning   [] body mechanics   [] transfers   [] heat/ice application   [] Splint wear/care   [] Sensory re-education   [] scar management      [] other:            Other Objective/Functional Measures:     Pt tolerated all wrist and digit ROM exercises without pain.      Pain Level (0-10 scale) post treatment: 1/10     ASSESSMENT/Changes in Function: improved digit ROM with heat and massage modalities, no increase in pain with constant wrist and digit ROM for clinic tasks. Patient will continue to benefit from skilled OT services to address ROM deficits, address strength deficits, analyze and address soft tissue restrictions and analyze and modify body mechanics/ergonomics to attain remaining goals. [x]  See Plan of Care  []  See progress note/recertification  []  See Discharge Summary         Progress towards goals / Updated goals:    Short Term Goals: To be accomplished in 2  weeks:  Goal:* Patient will be compliant with initial home exercise program to take an active role in their rehabilitation process. Status at Eval: Patient was provided with a basic home exercise program including wrist and digit AROM.  5/6/22: min cues for proper positioning with tendon glides, independent with blocking exercises.      Goal:* Patient will demonstrate a good understanding of their condition and strategies for self-management. Status at [facility]: pt educated on prognosis, diagnosis, treatment plan, precautions, splint wear, edema mgmt  5/6/22: compliant with edema massage strategies and compression sleeve wear schedule.      Long Term Goals: To be accomplished in 4 weeks:              Goal:* Patient will have 20 degrees of left wrist extension in order to increase ease with ADL's such as bathing, eating and dressing and to eventually bear weight thru the left upper extremity as in pushing up from a chair. Status at [facility]: -5 deg from neutral wrist  5/6/2022: 28 deg       Goal:* Patient will have 45 degrees of left wrist flexion in order to perform hygiene tasks and /or work with tools such as a screwdriver. Status at [facility]: 25 deg  5/6/2022: 30 deg       Goal:* Patient will have 35 degrees of left forearm supination in order to perform ADL's including washing face and accepting change.    Status at [facility]: 18 deg  5/6/2022: 30 deg                 Goal:*Patient will regain 50 degrees flexion of the left thumb to enable grasp of cylindrical objects such as a glass, handle or toothbrush. Status at Eval:16 deg MCP jt flexion     Goal:*Patient will attain 10 degrees or less of left  thumb extension to enable him/ her to grab and carry a cylindrical object. Status at Eval: 14 deg extension lag     Goal:* Patient will demonstrate a 0.5 cm decrease in edema of the left wrist in order to improve participation with grooming and dressing tasks. Status at Eval: left wrist circum - 18.5 cm       Goal:* Patient will show a 15 point improvement on FOTO functional status measure to improve overall functional performance.   Status at Eval: 35       PLAN  []  Upgrade activities as tolerated     [x]  Continue plan of care  []  Update interventions per flow sheet       []  Discharge due to:_  []  Other:_      MARA Ortiz 5/9/2022  10:44 AM    Future Appointments   Date Time Provider Prabhakar Trivedi   5/9/2022 10:45 AM MARA Loya [facility]   5/11/2022 10:00 AM MARA Loya [facility]   5/13/2022 10:00 AM Dorota Kaur OT [facility]   5/16/2022 10:00 AM Dorota Kaur OT [facility]   5/18/2022 10:00 AM MARA Loya [facility]   6/6/2022 11:10 AM Félix Colin DO [facility]

## 2022-05-11 ENCOUNTER — HOSPITAL ENCOUNTER (OUTPATIENT)
Dept: PHYSICAL THERAPY | Age: 42
Discharge: HOME | End: 2022-05-11
Payer: COMMERCIAL

## 2022-05-11 PROCEDURE — 97530 THERAPEUTIC ACTIVITIES: CPT

## 2022-05-11 PROCEDURE — 97110 THERAPEUTIC EXERCISES: CPT

## 2022-05-11 PROCEDURE — 97535 SELF CARE MNGMENT TRAINING: CPT

## 2022-05-11 NOTE — PROGRESS NOTES
OT DAILY TREATMENT NOTE     Patient Name: Sy Hutton  Date:2022  : 1980  [x]  Patient  Verified  Payor: Dave Dos Santos / Plan: Jackie Renteria / Product Type: HMO /    In time: 10:00  Out time: 10:44  Total Treatment Time (min): 44  Visit #: 7 of 12    Medicare/BCBS Only   Total Timed Codes (min):  44 1:1 Treatment Time:  44     Treatment Area: Other intraarticular fracture of lower end of left radius, initial encounter for closed fracture [S52.572A]  Other specified postprocedural states [Z98.890]  Personal history of (healed) traumatic fracture [Z87.81]    SUBJECTIVE  Pain Level (0-10 scale): 0/10  Any medication changes, allergies to medications, adverse drug reactions, diagnosis change, or new procedure performed?: [x] No    [] Yes (see summary sheet for update)  Subjective functional status/changes:   [] No changes reported    \"In the morning is when my wrist is more irritating\"   \" I have been doing my exercises as much as I can\"    OBJECTIVE    Modality rationale: decrease edema, decrease inflammation, decrease pain and increase tissue extensibility to improve the patients ability to move wrist and .     Min Type Additional Details    [] Estim:  []Unatt       []IFC  []Premod                        []Other:  []w/ice   []w/heat  Position:  Location:    [] Estim: []Att    []TENS instruct  []NMES                    []Other:  []w/US   []w/ice   []w/heat  Position:  Location:    []  Traction: [] Cervical       []Lumbar                       [] Prone          []Supine                       []Intermittent   []Continuous Lbs:  [] before manual  [] after manual    []  Ultrasound: []Continuous   [] Pulsed                           []1MHz   []3MHz W/cm2:  Location:    []  Iontophoresis with dexamethasone         Location: [] Take home patch   [] In clinic   5 with sc []  Ice     [x]  heat  []  Ice massage  []  Laser   []  Paraffin Position: seated/resting  Location: left wrist     []  Laser with stim  []  Other:  Position:  Location:    []  Vasopneumatic Device    []  Right     []  Left  Pre-treatment girth:  Post-treatment girth:  Measured at (location):  Pressure:       [] lo [] med [] hi   Temperature: [] lo [] med [] hi     [x] Skin assessment post-treatment:  [x]intact [x]redness- no adverse reaction    []redness - adverse reaction:     14 min Therapeutic Exercise:  [] See flow sheet :   Rationale: increase ROM to improve the patients ability to move right wrist for functional tasks.      Left hand/wrist:     Wrist flex/ext AROM   Wrist maze   Ball rolls   Towel roll up   Tendon glides     10 min Therapeutic Activity:  []  See flow sheet :   Rationale: increase ROM and improve coordination  to improve the patients ability to manipulate small items. Left hand:     Dexterity balls tabletop and in supination   Opposition with marbles   Abd/add with marbles      10 min Manual Therapy:  IASTM using tool # 6 using sweeping and fanning techniques    The manual therapy interventions were performed at a separate and distinct time from the therapeutic activities interventions. Rationale: increase tissue extensibility and decrease edema to dorsal left wrist, hand and digits.       10 min Self Care/Home Management: scar management, edema management, heat modalities, activity modifications. Rationale: patient education   to improve the patients ability to self-manage symptoms. With   [] TE   [] TA   [] neuro   [] other: Patient Education: [x] Review HEP    [x] Progressed/Changed HEP based on: min cues for proper positioning with tendons.    [] positioning   [] body mechanics   [] transfers   [] heat/ice application   [] Splint wear/care   [] Sensory re-education   [] scar management      [] other:            Other Objective/Functional Measures:    Completed all wrist AROM without pain   Pt able to oppose all digits to thumb      Pain Level (0-10 scale) post treatment: 0-1/10    ASSESSMENT/Changes in Function: improving wrist and digit AROM, pt able to make a loose fist, decreased stiffness with heat and massage modalities. Patient will continue to benefit from skilled OT services to address ROM deficits, address strength deficits, analyze and address soft tissue restrictions and analyze and modify body mechanics/ergonomics to attain remaining goals. [x]  See Plan of Care  []  See progress note/recertification  []  See Discharge Summary         Progress towards goals / Updated goals:    Short Term Goals: To be accomplished in 2  weeks:  Goal:* Patient will be compliant with initial home exercise program to take an active role in their rehabilitation process. Status at Eval: Patient was provided with a basic home exercise program including wrist and digit AROM.  5/6/22: min cues for proper positioning with tendon glides, independent with blocking exercises.      Goal:* Patient will demonstrate a good understanding of their condition and strategies for self-management. Status at Eval: pt educated on prognosis, diagnosis, treatment plan, precautions, splint wear, edema mgmt  5/6/22: compliant with edema massage strategies and compression sleeve wear schedule.      Long Term Goals: To be accomplished in 4 weeks:              Goal:* Patient will have 20 degrees of left wrist extension in order to increase ease with ADL's such as bathing, eating and dressing and to eventually bear weight thru the left upper extremity as in pushing up from a chair. Status at [facility]: -5 deg from neutral wrist  5/6/2022: 28 deg       Goal:* Patient will have 45 degrees of left wrist flexion in order to perform hygiene tasks and /or work with tools such as a screwdriver. Status at [facility]: 25 deg  5/6/2022: 30 deg       Goal:* Patient will have 35 degrees of left forearm supination in order to perform ADL's including washing face and accepting change.    Status at Eval: 18 deg  5/6/2022: 30 deg                 Goal:*Patient will regain 50 degrees flexion of the left thumb to enable grasp of cylindrical objects such as a glass, handle or toothbrush. Status at Eval:16 deg MCP jt flexion     Goal:*Patient will attain 10 degrees or less of left  thumb extension to enable him/ her to grab and carry a cylindrical object. Status at Eval: 14 deg extension lag     Goal:* Patient will demonstrate a 0.5 cm decrease in edema of the left wrist in order to improve participation with grooming and dressing tasks. Status at Eval: left wrist circum - 18.5 cm       Goal:* Patient will show a 15 point improvement on FOTO functional status measure to improve overall functional performance.   Status at Eval: 35        PLAN  []  Upgrade activities as tolerated     [x]  Continue plan of care  []  Update interventions per flow sheet       []  Discharge due to:_  []  Other:_      MARA Smith 5/11/2022  9:59 AM    Future Appointments   Date Time Provider Prabhakar Trivedi   5/11/2022 10:00 AM MARA Renee [facility]   5/13/2022 10:00 AM [facility]   5/16/2022 10:00 AM Khurram Campbell OT [facility]   5/18/2022 10:00 AM [facility]   6/6/2022 11:10 AM Félix Rosas DO [facility]

## 2022-05-13 ENCOUNTER — HOSPITAL ENCOUNTER (OUTPATIENT)
Dept: PHYSICAL THERAPY | Age: 42
Discharge: HOME | End: 2022-05-13
Payer: COMMERCIAL

## 2022-05-13 PROCEDURE — 97110 THERAPEUTIC EXERCISES: CPT

## 2022-05-13 PROCEDURE — 97140 MANUAL THERAPY 1/> REGIONS: CPT

## 2022-05-13 NOTE — PROGRESS NOTES
OT DAILY TREATMENT NOTE     Patient Name: Fausto Leventhal  Date:2022  : 1980  [x]  Patient  Verified  Payor: Vj Landeros / Plan: Gaviota Boldenill / Product Type: HMO /    In time:10:02 AM  Out time:1045 AM  Total Treatment Time (min): 43  Visit #: 8 of 12    Medicare/BCBS Only   Total Timed Codes (min):  33 1:1 Treatment Time:  43     Treatment Area: Other intraarticular fracture of lower end of left radius, initial encounter for closed fracture [S52.572A]  Other specified postprocedural states [Z98.890]  Personal history of (healed) traumatic fracture [Z87.81]    SUBJECTIVE  Pain Level (0-10 scale): 0/10  Any medication changes, allergies to medications, adverse drug reactions, diagnosis change, or new procedure performed?: [x] No    [] Yes (see summary sheet for update)  Subjective functional status/changes:   [] No changes reported  \"It is just stiff. \"    OBJECTIVE    Modality rationale: increase tissue extensibility to improve the patients ability to make a composite fist and move left wrist.    Min Type Additional Details    [] Estim:  []Unatt       []IFC  []Premod                        []Other:  []w/ice   []w/heat  Position:  Location:    [] Estim: []Att    []TENS instruct  []NMES                    []Other:  []w/US   []w/ice   []w/heat  Position:  Location:    []  Traction: [] Cervical       []Lumbar                       [] Prone          []Supine                       []Intermittent   []Continuous Lbs:  [] before manual  [] after manual    []  Ultrasound: []Continuous   [] Pulsed                           []1MHz   []3MHz W/cm2:  Location:    []  Iontophoresis with dexamethasone         Location: [] Take home patch   [] In clinic   10 []  Ice     []  heat  []  Ice massage  []  Laser   [x]  Paraffin Position: seated, resting  Location: left hand and wrist    []  Laser with stim  []  Other:  Position:  Location:    []  Vasopneumatic Device    []  Right     []  Left  Pre-treatment girth:  Post-treatment girth:  Measured at (location):  Pressure:       [] lo [] med [] hi   Temperature: [] lo [] med [] hi       [x] Skin assessment post-treatment:  [x]intact [x]redness- no adverse reaction    []redness - adverse reaction:     23 min Therapeutic Exercise:  [x] See flow sheet :   Rationale: increase ROM, increase strength and improve coordination to improve the patients ability to grasp, , manipulate items using left hand    10 min Manual Therapy:  IASTM using tools #5, 6 using sweeping and fanning techniques   The manual therapy interventions were performed at a separate and distinct time from the therapeutic activities interventions. Rationale: increase tissue extensibility and decrease edema  to left dorsal forearm, wrist, hand and digits    With   [] TE   [] TA   [] neuro   [] other: Patient Education: [x] Review HEP    [] Progressed/Changed HEP based on:   [] positioning   [] body mechanics   [] transfers   [] heat/ice application   [] Splint wear/care   [] Sensory re-education   [] scar management      [] other:            Other Objective/Functional Measures:   Range of Motion:     Elbow/Wrist   Wrist 4/27/2022  Left 5/6/20 5/13/2022  Left        Flex 25 30 35        Ext -5 28 32        UD 7 10 20        RD 5 14 15        FA              Pro 80 90 83        Sup 18 30 39          Thumb opposition to all digits  Left wrist edema cm - 18.6 circum     Pain Level (0-10 scale) post treatment: 0/10    ASSESSMENT/Changes in Function: Improved wrist AROM noted. Initiated translation tasks using left hand with mild difficulty noted. Issued new compression garment for wear on left wrist.      Patient will continue to benefit from skilled OT services to modify and progress therapeutic interventions, address ROM deficits, address strength deficits, analyze and address soft tissue restrictions and instruct in home and community integration to attain remaining goals.      []  See Plan of Care  []  See progress note/recertification  []  See Discharge Summary         Progress towards goals / Updated goals:  Short Term Goals: To be accomplished in 2  weeks:  Goal:* Patient will be compliant with initial home exercise program to take an active role in their rehabilitation process. Status at Eval: Patient was provided with a basic home exercise program including wrist and digit AROM.  5/6/22: min cues for proper positioning with tendon glides, independent with blocking exercises.      Goal:* Patient will demonstrate a good understanding of their condition and strategies for self-management. Status at Eval: pt educated on prognosis, diagnosis, treatment plan, precautions, splint wear, edema mgmt  5/6/22: compliant with edema massage strategies and compression sleeve wear schedule.      Long Term Goals: To be accomplished in 4 weeks:              Goal:* Patient will have 20 degrees of left wrist extension in order to increase ease with ADL's such as bathing, eating and dressing and to eventually bear weight thru the left upper extremity as in pushing up from a chair. Status at Eval: -5 deg from neutral wrist  5/6/2022: 28 deg  5/13/2022 - 32 deg       Goal:* Patient will have 45 degrees of left wrist flexion in order to perform hygiene tasks and /or work with tools such as a screwdriver. Status at Eval: 25 deg  5/6/2022: 30 deg  5/13/2022 - 35 deg       Goal:* Patient will have 35 degrees of left forearm supination in order to perform ADL's including washing face and accepting change. Status at Eval: 18 deg  5/6/2022: 30 deg   5/13/2022 - 39 deg                Goal:*Patient will regain 50 degrees flexion of the left thumb to enable grasp of cylindrical objects such as a glass, handle or toothbrush. Status at Eval:16 deg MCP jt flexion     Goal:*Patient will attain 10 degrees or less of left  thumb extension to enable him/ her to grab and carry a cylindrical object.   Status at Eval: 14 deg extension lag     Goal:* Patient will demonstrate a 0.5 cm decrease in edema of the left wrist in order to improve participation with grooming and dressing tasks. Status at Eval: left wrist circum - 18.5 cm       Goal:* Patient will show a 15 point improvement on FOTO functional status measure to improve overall functional performance.   Status at Eval: 35    PLAN  []  Upgrade activities as tolerated     [x]  Continue plan of care  []  Update interventions per flow sheet       []  Discharge due to:_  []  Other:_      Laura Echevarria OT 5/13/2022  10:14 AM    Future Appointments   Date Time Provider Prabhakar Trivedi   5/16/2022 10:00 [facility]   [facility] 10:00 [facility]   6/6/2022 11:10 AM Félix Anderson,  [facility]

## 2022-05-16 ENCOUNTER — HOSPITAL ENCOUNTER (OUTPATIENT)
Dept: PHYSICAL THERAPY | Age: 42
Discharge: HOME | End: 2022-05-16
Payer: COMMERCIAL

## 2022-05-16 PROCEDURE — 97110 THERAPEUTIC EXERCISES: CPT

## 2022-05-16 PROCEDURE — 97535 SELF CARE MNGMENT TRAINING: CPT

## 2022-05-16 PROCEDURE — 97140 MANUAL THERAPY 1/> REGIONS: CPT

## 2022-05-16 NOTE — PROGRESS NOTES
OT DAILY TREATMENT NOTE     Patient Name: Vera Hameed  Date:2022  : 1980  [x]  Patient  Verified  Payor: Gladys Morganer / Plan: Francisco Alvares / Product Type: HMO /    In time:10:05 AM  Out time:10:47 PM  Total Treatment Time (min): 42  Visit #: 9 of 12    Medicare/BCBS Only   Total Timed Codes (min):  42 1:1 Treatment Time:  42     Treatment Area: Other intraarticular fracture of lower end of left radius, initial encounter for closed fracture [S52.572A]  Other specified postprocedural states [Z98.890]  Personal history of (healed) traumatic fracture [Z87.81]    SUBJECTIVE  Pain Level (0-10 scale): 0/10  Any medication changes, allergies to medications, adverse drug reactions, diagnosis change, or new procedure performed?: [x] No    [] Yes (see summary sheet for update)  Subjective functional status/changes:   [] No changes reported  \"I massage the scar. \"    \"I work the end and at the end of the day it isn't as stiff. \"    OBJECTIVE    Modality rationale: decrease pain and increase tissue extensibility to improve the patients ability to move left wrist and FA for functional tasks.     Min Type Additional Details    [] Estim:  []Unatt       []IFC  []Premod                        []Other:  []w/ice   []w/heat  Position:  Location:    [] Estim: []Att    []TENS instruct  []NMES                    []Other:  []w/US   []w/ice   []w/heat  Position:  Location:    []  Traction: [] Cervical       []Lumbar                       [] Prone          []Supine                       []Intermittent   []Continuous Lbs:  [] before manual  [] after manual    []  Ultrasound: []Continuous   [] Pulsed                           []1MHz   []3MHz W/cm2:  Location:    []  Iontophoresis with dexamethasone         Location: [] Take home patch   [] In clinic   5, concurrent with self care []  Ice     [x]  Heat MHP  []  Ice massage  []  Laser   []  Paraffin Position:seated, resting  Location: left wrist    []  Laser with stim  [] Other:  Position:  Location:    []  Vasopneumatic Device    []  Right     []  Left  Pre-treatment girth:  Post-treatment girth:  Measured at (location):  Pressure:       [] lo [] med [] hi   Temperature: [] lo [] med [] hi       [x] Skin assessment post-treatment:  [x]intact [x]redness- no adverse reaction    []redness - adverse reaction:        24 min Therapeutic Exercise:  [x]? See flow sheet :   Rationale: increase ROM, increase strength and improve coordination to improve the patients ability to grasp, , manipulate items using left hand    Left hand    Balls rolls wrist flex and extend  digiflex - yellow -  and individual   Wrist maze  Translation with coins  Hook fist  Tendon glides  Wrist AROM     10 min Manual Therapy:  IASTM using tools #5, 6 using sweeping and fanning techniques   The manual therapy interventions were performed at a separate and distinct time from the therapeutic activities interventions. Rationale: increase tissue extensibility and decrease edema  to left dorsal forearm, wrist, hand and digits    8 min Self Care/Home Management: edema mgmt   Rationale: education  to improve the patients ability to promote healing of surgical site on left wrist.        With   [] TE   [] TA   [] neuro   [] other: Patient Education: [x] Review HEP    [] Progressed/Changed HEP based on:   [] positioning   [] body mechanics   [] transfers   [] heat/ice application   [] Splint wear/care   [] Sensory re-education   [] scar management      [] other:            Other Objective/Functional Measures:   Left wrist cm circum - 18.2 cm at end of treatment session     Pain Level (0-10 scale) post treatment: 0/10    ASSESSMENT/Changes in Function: Reduced edema with IASTM and therex. Good composite fist noted with cueing for composite fist before completing in hand manipulation tasks. Decreased sessions to 2x per week this week.  Pt is compliant with HEP and understanding of strategies for mgmt of edema and scar at this time. Patient will continue to benefit from skilled OT services to modify and progress therapeutic interventions, address ROM deficits, address strength deficits, analyze and address soft tissue restrictions and instruct in home and community integration to attain remaining goals. []  See Plan of Care  []  See progress note/recertification  []  See Discharge Summary         Progress towards goals / Updated goals:  Short Term Goals: To be accomplished in 2  weeks:  Goal:* Patient will be compliant with initial home exercise program to take an active role in their rehabilitation process. Status at Eval: Patient was provided with a basic home exercise program including wrist and digit AROM.  5/6/22: min cues for proper positioning with tendon glides, independent with blocking exercises. 5/16/2022 - good demo of HEP, goal met      Goal:* Patient will demonstrate a good understanding of their condition and strategies for self-management. Status at [facility]: pt educated on prognosis, diagnosis, treatment plan, precautions, splint wear, edema mgmt  5/6/22: compliant with edema massage strategies and compression sleeve wear schedule. 5/16/2022 - pt verbalizes and demo good understanding of edema and scar mgmt, goal met      Long Term Goals: To be accomplished in 4 weeks:              Goal:* Patient will have 20 degrees of left wrist extension in order to increase ease with ADL's such as bathing, eating and dressing and to eventually bear weight thru the left upper extremity as in pushing up from a chair. Status at [facility]: -5 deg from neutral wrist  5/6/2022: 28 deg  5/13/2022 - 32 deg       Goal:* Patient will have 45 degrees of left wrist flexion in order to perform hygiene tasks and /or work with tools such as a screwdriver.   Status at [facility]: 25 deg  5/6/2022: 30 deg  5/13/2022 - 35 deg       Goal:* Patient will have 35 degrees of left forearm supination in order to perform ADL's including washing face and accepting change. Status at Eval: 18 deg  5/6/2022: 30 deg   5/13/2022 - 39 deg                Goal:*Patient will regain 50 degrees flexion of the left thumb to enable grasp of cylindrical objects such as a glass, handle or toothbrush. Status at Eval:16 deg MCP jt flexion     Goal:*Patient will attain 10 degrees or less of left  thumb extension to enable him/ her to grab and carry a cylindrical object. Status at Eval: 14 deg extension lag     Goal:* Patient will demonstrate a 0.5 cm decrease in edema of the left wrist in order to improve participation with grooming and dressing tasks. Status at Eval: left wrist circum - 18.5 cm       Goal:* Patient will show a 15 point improvement on FOTO functional status measure to improve overall functional performance.   Status at Eval: 35    PLAN  []  Upgrade activities as tolerated     []  Continue plan of care  []  Update interventions per flow sheet       []  Discharge due to:_  []  Other:_      Jarvis Juarez OT 5/16/2022  10:05 AM    Future Appointments   Date Time Provider Prabhakar Trivedi   5/18/2022 10:00 AM MARA Mora [facility]   6/6/2022 11:10 AM Félix Avendano DO [facility] AM[facility]

## 2022-05-18 ENCOUNTER — HOSPITAL ENCOUNTER (OUTPATIENT)
Dept: PHYSICAL THERAPY | Age: 42
Discharge: HOME | End: 2022-05-18
Payer: COMMERCIAL

## 2022-05-18 PROCEDURE — 97535 SELF CARE MNGMENT TRAINING: CPT

## 2022-05-18 PROCEDURE — 97110 THERAPEUTIC EXERCISES: CPT

## 2022-05-18 PROCEDURE — 97530 THERAPEUTIC ACTIVITIES: CPT

## 2022-05-18 NOTE — PROGRESS NOTES
OT DAILY TREATMENT NOTE     Patient Name: Leodan Stanton  Date:2022  : 1980  [x]  Patient  Verified  Payor: Paige Clark / Plan: Michelle Gaston / Product Type: HMO /    In time: 10:04 Out time: 10:44   Total Treatment Time (min): 40  Visit #: 10 of 12    Medicare/BCBS Only   Total Timed Codes (min):  40 1:1 Treatment Time:  40     Treatment Area: Other intraarticular fracture of lower end of left radius, initial encounter for closed fracture [S52.572A]  Other specified postprocedural states [Z98.890]  Personal history of (healed) traumatic fracture [Z87.81]    SUBJECTIVE  Pain Level (0-10 scale): 0/10  Any medication changes, allergies to medications, adverse drug reactions, diagnosis change, or new procedure performed?: [x] No    [] Yes (see summary sheet for update)  Subjective functional status/changes:   [] No changes reported    \"arm feels okay\"   \" things are getting easier to do\"     OBJECTIVE    Modality rationale: decrease edema, decrease inflammation, decrease pain and increase tissue extensibility to improve the patients ability to move left UE for functional daily tasks.     Min Type Additional Details    [] Estim:  []Unatt       []IFC  []Premod                        []Other:  []w/ice   []w/heat  Position:  Location:    [] Estim: []Att    []TENS instruct  []NMES                    []Other:  []w/US   []w/ice   []w/heat  Position:  Location:    []  Traction: [] Cervical       []Lumbar                       [] Prone          []Supine                       []Intermittent   []Continuous Lbs:  [] before manual  [] after manual    []  Ultrasound: []Continuous   [] Pulsed                           []1MHz   []3MHz W/cm2:  Location:    []  Iontophoresis with dexamethasone         Location: [] Take home patch   [] In clinic   8 with sc  []  Ice     [x]  heat  []  Ice massage  []  Laser   []  Paraffin Position: seated/resting  Location: left hand/wrist     []  Laser with stim  []  Other: Position:  Location:    []  Vasopneumatic Device    []  Right     []  Left  Pre-treatment girth:  Post-treatment girth:  Measured at (location):  Pressure:       [] lo [] med [] hi   Temperature: [] lo [] med [] hi     [x] Skin assessment post-treatment:  [x]intact [x]redness- no adverse reaction    []redness - adverse reaction:     20 min Therapeutic Exercise:  [] See flow sheet :   Rationale: increase ROM and increase strength to improve the patients ability to move wrist and  for functional daily activities. Left hand/wrist:     Wrist AROM HEP    Ball rolls   Tendon glides   Card fanning thumb AROM   Wrist ROM measurements for recheck   Thumb ROM measurements for recheck     10 min Therapeutic Activity:  []  See flow sheet :   Rationale: increase ROM and improve coordination  to improve the patients ability to manipulate small items. Left hand:     Small dexterity balls   9-hole peg test - bilateral hands    10 min Self Care/Home Management: FOTO , scar management, edema management. Rationale: patient education   to improve the patients ability to self-manage symptoms. With   [] TE   [] TA   [] neuro   [] other: Patient Education: [x] Review HEP    [x] Progressed/Changed HEP based on: independent with ROM HEP's.  [] positioning   [] body mechanics   [] transfers   [] heat/ice application   [] Splint wear/care   [] Sensory re-education   [] scar management      [] other:            Other Objective/Functional Measures:      Range of Motion:     Elbow/Wrist   Wrist 4/27/2022  Left 5/18/22  Left          Flex 25  42 (+17)         Ext -5  40 (+45)         UD 7  20 (+13)         RD 5  11 (+6)         FA              Pro 80  90 (+10)         Sup 18  68 (+50)            THUMB ROM CHART as measured in degrees  Thumb, Side  Active/Passive Date:  4/27/2022 5/18/2022  Left   MP 14-16  10- 48 (+32)   IP 0-12  0-50 (+38)   Radial Abd. 35  58 (+23)   Palmar Abd.       Opposition IF and MF only  all       Strength:  NT     Sensation:    intact        Edema: GIRTH CHART measured in cm  Date: 4/27/2022 5/18/2022    Side Right/Left  Left   DPC circum. 21.7/21.1  21cm   Wrist Crease 16.7/18.5 18.7cm     FA        Elbow          9 [facility]   5/18/22 DATE Change     Right 20       Left 25               Pain Level (0-10 scale) post treatment: 0/10    ASSESSMENT/Changes in Function: improved wrist and thumb AROM, slight increased in wrist crease edema, slight deficits in coordination and fine motor speed in left hand , decreased pain and stiffness with heat modalities. Patient will continue to benefit from skilled OT services to address ROM deficits, address strength deficits, analyze and address soft tissue restrictions and analyze and modify body mechanics/ergonomics to attain remaining goals. [x]  See Plan of Care  []  See progress note/recertification  []  See Discharge Summary         Progress towards goals / Updated goals:    Short Term Goals: To be accomplished in 2  weeks:  Goal:* Patient will be compliant with initial home exercise program to take an active role in their rehabilitation process. Status at Eval: Patient was provided with a basic home exercise program including wrist and digit AROM.  5/6/22: min cues for proper positioning with tendon glides, independent with blocking exercises. Status at PN: 5/16/2022 - good demo of HEP, goal met        Goal:* Patient will demonstrate a good understanding of their condition and strategies for self-management. Status at Eval: pt educated on prognosis, diagnosis, treatment plan, precautions, splint wear, edema mgmt  5/6/22: compliant with edema massage strategies and compression sleeve wear schedule.   Status at PN:5/16/2022 - pt verbalizes and demo good understanding of edema and scar mgmt, goal met      Long Term Goals: To be accomplished in 4 weeks:              Goal:* Patient will have 20 degrees of left wrist extension in order to increase ease with ADL's such as bathing, eating and dressing and to eventually bear weight thru the left upper extremity as in pushing up from a chair. Status at Eval: -5 deg from neutral wrist  5/6/2022: 28 deg  5/13/2022 - 32 deg   Status at PN: 40 (+45) deg. Goal met.       Goal:* Patient will have 45 degrees of left wrist flexion in order to perform hygiene tasks and /or work with tools such as a screwdriver. Status at Eval: 25 deg  5/6/2022: 30 deg  5/13/2022 - 35 deg  Status at PN:  42 (+17) deg , progressing towards goal.       Goal:* Patient will have 35 degrees of left forearm supination in order to perform ADL's including washing face and accepting change. Status at Eval: 18 deg  5/6/2022: 30 deg   5/13/2022 - 39 deg  Status at PN: 68 (+50) deg. Goal met.                 Goal:*Patient will regain 50 degrees flexion of the left thumb to enable grasp of cylindrical objects such as a glass, handle or toothbrush. Status at Eval:16 deg MCP jt flexion  Status at PN: 48 (+32) deg. Progressing towards goal.      Goal:*Patient will attain 10 degrees or less of left  thumb extension to enable him/ her to grab and carry a cylindrical object. Status at Eval: 14 deg extension lag  Status at PN: 10 (-4) deg. Goal met.      Goal:* Patient will demonstrate a 0.5 cm decrease in edema of the left wrist in order to improve participation with grooming and dressing tasks. Status at Eval: left wrist circum - 18.5 cm    Status at PN: left wrist 18.7cm ( + . 2) slight increase. Goal not met.      Goal:* Patient will show a 15 point improvement on FOTO functional status measure to improve overall functional performance. Status at Eval: 35  Status at PN: 57 (+22). Goal met.      PLAN  []  Upgrade activities as tolerated     [x]  Continue plan of care  []  Update interventions per flow sheet        []  Discharge due to:_  []  Other:_      MARA Ba 5/18/2022  9:58 AM    Future Appointments   Date Time Provider Department [place]   5/18/2022 10:00 AM MARA Frias [facility]   5/23/2022 10:00 AM Isis Chakraborty, OT [facility]   5/27/2022 11:30 AM MARA Frias [facility]   5/31/2022  9:15 AM Tony Maradiaga, MARA [facility]   6/3/2022 10:00 AM Isis Chakraborty, OT [facility]   6/6/2022 10:00 AM Isis Chakraborty, OT [facility]   6/6/2022 11:10 AM Félix Vidal, DO [facility]   6/10/2022 10:00 AM Isis Chakraborty, OT [facility]   6/13/2022 10:00 AM Isis Chakraborty, OT [facility]   6/17/2022 10:00 AM MARA Frias [facility]

## 2022-05-18 NOTE — PROGRESS NOTES
[facility]   [address]  [place], [address]  [phone] fax    Occupational Therapy Progress Note  Patient name: Amrit Ibrahim Start of Care: 2022   Referral source: Lance Esposito DO : 1980   Medical/Treatment Diagnosis: Other intraarticular fracture of lower end of left radius, initial encounter for closed fracture [S52.572A]  Other specified postprocedural states [Z98.890]  Personal history of (healed) traumatic fracture [Z87.81]  Payor: BLUE CROSS / Plan: Perla Drape / Product Type: HMO /  Onset Date:3/29/2022, sx 2022          Prior Hospitalization: see medical history Provider#: [id number]   Medications: Verified on Patient Summary List    Comorbidities: none reported  Prior Level of Function:gardening, (I) with ADL/IADL tasks without functional limitations and pain using left hand and wrist  Visits from Start of Care: 10    Missed Visits: 0    Established Goals:         Excellent           Good         Limited           None  [x] Increased ROM   []  []  [x]  []  [x] Increased Strength  []  []  [x]  []  [] Increased Mobility  []  []  []  []   [] Decreased Pain   []  []  []  []  [x] Decreased Swelling  []  []  [x]  []  [x] Increased Fine Motor Skills []  []  [x]  []  [x] Increased ADL [place] []  []  [x]  []    Key Functional Changes:  improved wrist and thumb AROM, slight increased in wrist crease edema, slight deficits in coordination and fine motor speed in left hand. Progress on current goals:     Short Term Goals: To be accomplished in 2  weeks:  Goal:* Patient will be compliant with initial home exercise program to take an active role in their rehabilitation process. Status at Eval: Patient was provided with a basic home exercise program including wrist and digit AROM.   Status at PN: 2022 - good demo of HEP, goal met      Goal:* Patient will demonstrate a good understanding of their condition and strategies for self-management. Status at Eval: pt educated on prognosis, diagnosis, treatment plan, precautions, splint wear, edema mgmt  Status at PN:5/16/2022 - pt verbalizes and demo good understanding of edema and scar mgmt, goal met      Long Term Goals: To be accomplished in 4 weeks:              Goal:* Patient will have 20 degrees of left wrist extension in order to increase ease with ADL's such as bathing, eating and dressing and to eventually bear weight thru the left upper extremity as in pushing up from a chair. Status at Eval: -5 deg from neutral wrist  Status at PN: 40 (+45) deg. Goal met.       Goal:* Patient will have 45 degrees of left wrist flexion in order to perform hygiene tasks and /or work with tools such as a screwdriver. Status at Eval: 25 deg  Status at PN:  42 (+17) deg , progressing towards goal.       Goal:* Patient will have 35 degrees of left forearm supination in order to perform ADL's including washing face and accepting change. Status at Eval: 18 deg  Status at PN: 68 (+50) deg. Goal met.                 Goal:*Patient will regain 50 degrees flexion of the left thumb to enable grasp of cylindrical objects such as a glass, handle or toothbrush. Status at Eval:16 deg MCP jt flexion  Status at PN: 48 (+32) deg. Progressing towards goal.      Goal:*Patient will attain 10 degrees or less of left  thumb extension to enable him/ her to grab and carry a cylindrical object. Status at Eval: 14 deg extension lag  Status at PN: 10 (-4) deg. Goal met.      Goal:* Patient will demonstrate a 0.5 cm decrease in edema of the left wrist in order to improve participation with grooming and dressing tasks. Status at Eval: left wrist circum - 18.5 cm    Status at PN: left wrist 18.7cm ( + . 2) slight increase. Goal not met.      Goal:* Patient will show a 15 point improvement on FOTO functional status measure to improve overall functional performance. Status at Eval: 35  Status at PN: 57 (+22). Goal met. Updated Goals: to be achieved in 4 weeks:    Goal:* Patient will be compliant with ROM and strengthening home exercise program to take an active role in their rehabilitation process. Status at PN: strengthening not yet initiated.       Goal:* Patient will have 45 degrees of left wrist flexion in order to perform hygiene tasks and /or work with tools such as a screwdriver. Status at PN:  42 (+17) deg.      Goal:*Patient will regain 50 degrees flexion of the left thumb to enable grasp of cylindrical objects such as a glass, handle or toothbrush. Status at PN: 48 (+32) deg MCP jt flexion     Goal:* Patient will demonstrate a 0.5 cm decrease in edema of the left wrist in order to improve participation with grooming and dressing tasks. Status at PN: left wrist 18.7cm ( + . 2) slight increase. Goal: *Patient will improve left hand fine motor speed by at least 15% for better manipulation of small items   Status at PN: right 20 seconds, left 25 seconds. Goal:* Pt will have 35 pounds of  in the left hand to allow for functional grasp for all ADL activities including dressing, bathing and self care. Status at PN: NT at this time.          ASSESSMENT/RECOMMENDATIONS: Continued skilled OT services are necessary to address ROM and strength deficits and improve quality of life.     [x]Continue therapy per initial plan/protocol at a frequency of  2-3 x per week for 4 weeks  []Continue therapy with the following recommended changes:_____________________      _____________________________________________________________________  []Discontinue therapy progressing towards or have reached established goals  []Discontinue therapy due to lack of appreciable progress towards goals  []Discontinue therapy due to lack of attendance or compliance  []Await Physician's recommendations/decisions regarding therapy  []Other:________________________________________________________________    Thank you for this referral.   MARA Hunt COTA/L  5/18/2022 12:55 PM  NOTE TO PHYSICIAN:  PLEASE COMPLETE THE ORDERS BELOW AND   FAX TO [facility]: [phone]  If you are unable to process this request in 24 hours please contact our office: [phone]    ? I have read the above report and request that my patient continue as recommended. ? I have read the above report and request that my patient continue therapy with the following changes/special instructions:__________________________________________________________  ? I have read the above report and request that my patient be discharged from therapy.     [de-identified] Signature:____________Date:_________TIME:________    [employer], Date and Time must be completed for valid certification **

## 2022-05-23 ENCOUNTER — HOSPITAL ENCOUNTER (OUTPATIENT)
Dept: PHYSICAL THERAPY | Age: 42
Discharge: HOME | End: 2022-05-23
Payer: COMMERCIAL

## 2022-05-23 PROCEDURE — 97535 SELF CARE MNGMENT TRAINING: CPT

## 2022-05-23 PROCEDURE — 97035 APP MDLTY 1+ULTRASOUND EA 15: CPT

## 2022-05-23 PROCEDURE — 97110 THERAPEUTIC EXERCISES: CPT

## 2022-05-23 NOTE — PROGRESS NOTES
OT DAILY TREATMENT NOTE     Patient Name: Marizol Mckeon  Date:2022  : 1980  [x]  Patient  Verified  Payor: John Moulton / Plan: Hallie Hill / Product Type: HMO /    In time:10:02 AM  Out time:10:44 AM  Total Treatment Time (min): 42  Visit #: 1 of 8    Medicare/BCBS Only   Total Timed Codes (min):  42 1:1 Treatment Time:  42     Treatment Area: Other intraarticular fracture of lower end of left radius, initial encounter for closed fracture [S52.572A]  Other specified postprocedural states [Z98.890]  Personal history of (healed) traumatic fracture [Z87.81]    SUBJECTIVE  Pain Level (0-10 scale): 0/10  Any medication changes, allergies to medications, adverse drug reactions, diagnosis change, or new procedure performed?: [x] No    [] Yes (see summary sheet for update)  Subjective functional status/changes:   [] No changes reported  \"I am trying to use if more. \"    OBJECTIVE    Modality rationale: decrease pain and increase tissue extensibility to improve the patients ability to functionally use left hand for daily tasks.     Min Type Additional Details    [] Estim:  []Unatt       []IFC  []Premod                        []Other:  []w/ice   []w/heat  Position:  Location:    [] Estim: []Att    []TENS instruct  []NMES                    []Other:  []w/US   []w/ice   []w/heat  Position:  Location:    []  Traction: [] Cervical       []Lumbar                       [] Prone          []Supine                       []Intermittent   []Continuous Lbs:  [] before manual  [] after manual   8 [x]  Ultrasound: [x]Continuous   [] Pulsed                           []1MHz   [x]3MHz W/cm2: 1.0  Location: left volar wrist distal scar    []  Iontophoresis with dexamethasone         Location: [] Take home patch   [] In clinic   8, concurrent with self care []  Ice     [x]  Heat MHP  []  Ice massage  []  Laser   []  Paraffin Position: seated, resting  Location: left hand and wrist    []  Laser with stim  []  Other: Position:  Location:    []  Vasopneumatic Device    []  Right     []  Left  Pre-treatment girth:  Post-treatment girth:  Measured at (location):  Pressure:       [] lo [] med [] hi   Temperature: [] lo [] med [] hi       [x] Skin assessment post-treatment:  [x]intact [x]redness- no adverse reaction    []redness - adverse reaction:     14 min Therapeutic Exercise:  [x] See flow sheet :   Rationale: increase ROM and increase strength to improve the patients ability to move left wrist and hand for grooming, bathing, and dressing tasks    10 min Therapeutic Activity:  [x]  See flow sheet :   Rationale: increase ROM and improve coordination  to improve the patients ability to manipulate items using left hand     10 min Self Care/Home Management: US, scar mgmt, warm soaks   Rationale: education  to improve the patients ability to improve functional use of left wrist    With   [] TE   [] TA   [] neuro   [] other: Patient Education: [x] Review HEP    [] Progressed/Changed HEP based on:   [] positioning   [] body mechanics   [] transfers   [] heat/ice application   [] Splint wear/care   [] Sensory re-education   [] scar management      [] other:            Other Objective/Functional Measures:   Range of Motion:     Elbow/Wrist   Wrist 4/27/2022  Left 5/18/22  Left          Flex 25  42 (+17)         Ext -5  40 (+45)         UD 7  20 (+13)         RD 5  11 (+6)         FA              Pro 80  90 (+10)         Sup 18  68 (+50)            THUMB ROM CHART as measured in degrees  Thumb, Side  Active/Passive Date:  4/27/2022 5/18/2022  Left   MP 14-16  10- 48 (+32)   IP 0-12  0-50 (+38)   Radial Abd. 35  58 (+23)   Palmar Abd.       Opposition IF and MF only  all            Edema: GIRTH CHART measured in cm  Date: 4/27/2022 5/18/2022 5/23/2022     Side Right/Left  Left Left   DPC circum.  21.7/21.1  21cm    Wrist Crease 16.7/18.5 18.7cm   18.3   FA         Elbow           9 Hole    5/18/22 DATE Change   Right 20       Left 25                Pain Level (0-10 scale) post treatment: 0/10    ASSESSMENT/Changes in Function: Pain with palpation to distal scar, also keloid of scar. Initiated US to scar to reduce keloid appearing. Pt instructed to perform warm soaks for 15 minutes, 2x daily to dissolve suture in distal scar where there is pain with palpation. Reduced edema noted in left wrist.  Good composite fist noted and pt questioned when he can return to work. Referred pt to referring provider, and encouraged light use of left UE for daily tasks. Patient will continue to benefit from skilled OT services to modify and progress therapeutic interventions, address ROM deficits, address strength deficits, analyze and address soft tissue restrictions and instruct in home and community integration to attain remaining goals. []  See Plan of Care  []  See progress note/recertification  []  See Discharge Summary         Progress towards goals / Updated goals:  Goal:* Patient will be compliant with ROM and strengthening home exercise program to take an active role in their rehabilitation process. Status at PN: strengthening not yet initiated.       Goal:* Patient will have 45 degrees of left wrist flexion in order to perform hygiene tasks and /or work with tools such as a screwdriver. Status at PN:  42 (+17) deg.      Goal:*Patient will regain 50 degrees flexion of the left thumb to enable grasp of cylindrical objects such as a glass, handle or toothbrush. Status at PN: 48 (+32) deg MCP jt flexion     Goal:* Patient will demonstrate a 0.5 cm decrease in edema of the left wrist in order to improve participation with grooming and dressing tasks.   Status at PN: left wrist 18.7cm ( + . 2) slight increase.      Goal: *Patient will improve left hand fine motor speed by at least 15% for better manipulation of small items   Status at PN: right 20 seconds, left 25 seconds.      Goal:* Pt will have 35 pounds of  in the left hand to allow for functional grasp for all ADL activities including dressing, bathing and self care. Status at PN: NT at this time.       PLAN  []  Upgrade activities as tolerated     [x]  Continue plan of care  []  Update interventions per flow sheet       []  Discharge due to:_  []  Other:_      Kymberly Underwood OT 5/23/2022  10:07 AM    Future Appointments   Date Time Provider Prabhakar Trivedi   5/27/2022 11:30 AM MARA Tyson [facility]   5/31/2022  9:15 AM MARA Tyson [facility]   6/3/2022 10:00 AM Mc Cramer, OT [facility]   6/6/2022 10:00 AM Mc Cramer, OT [facility]   6/6/2022 11:10 AM Rosmery PIPER DO [facility]   6/10/2022 10:00 AM Mc Cramer, IZA [facility]   6/13/2022 10:00 AM Mc Cramer, OT [facility]   6/17/2022 10:00 AM MARA Tyson [facility]

## 2022-05-27 ENCOUNTER — HOSPITAL ENCOUNTER (OUTPATIENT)
Dept: PHYSICAL THERAPY | Age: 42
Discharge: HOME | End: 2022-05-27
Payer: COMMERCIAL

## 2022-05-27 PROCEDURE — 97535 SELF CARE MNGMENT TRAINING: CPT

## 2022-05-27 PROCEDURE — 97110 THERAPEUTIC EXERCISES: CPT

## 2022-05-27 PROCEDURE — 97035 APP MDLTY 1+ULTRASOUND EA 15: CPT

## 2022-05-27 NOTE — PROGRESS NOTES
OT DAILY TREATMENT NOTE     Patient Name: Ashley Matos  Date:2022  : 1980  [x]  Patient  Verified  Payor: Estela Juarez / Plan: Christina Hughes / Product Type: HMO /    In time:11:28 Out time:1115  Total Treatment Time (min): 47  Visit #: 2 of 8    Medicare/BCBS Only   Total Timed Codes (min):  47 1:1 Treatment Time:  47     Treatment Area: Other intraarticular fracture of lower end of left radius, initial encounter for closed fracture [S52.572A]  Other specified postprocedural states [Z98.890]  Personal history of (healed) traumatic fracture [Z87.81]    SUBJECTIVE  Pain Level (0-10 scale): 0/10  Any medication changes, allergies to medications, adverse drug reactions, diagnosis change, or new procedure performed?: [x] No    [] Yes (see summary sheet for update)  Subjective functional status/changes:   [x] No changes reported  \"I am trying to do the soaks but I usually use a warm wet cloth. \"  It hurts sometimes at work, moving things/stocking things. \"  OBJECTIVE    Modality rationale: increase tissue extensibility to improve the patients ability to utilize left hand in functional tasks   Min Type Additional Details    [] Estim:  []Unatt       []IFC  []Premod                        []Other:  []w/ice   []w/heat  Position:  Location:    [] Estim: []Att    []TENS instruct  []NMES                    []Other:  []w/US   []w/ice   []w/heat  Position:  Location:    []  Traction: [] Cervical       []Lumbar                       [] Prone          []Supine                       []Intermittent   []Continuous Lbs:  [] before manual  [] after manual   8 [x]  Ultrasound: [x]Continuous   [] Pulsed                           []1MHz   [x]3MHz W/cm2: 1.0  Location:left volar wrist    []  Iontophoresis with dexamethasone         Location: [] Take home patch   [] In clinic   10  Concurrent with home management []  Ice     [x]  heat  []  Ice massage  []  Laser   []  Paraffin Position:   Location: left wrist    []  Laser with stim  []  Other:  Position:  Location:    []  Vasopneumatic Device    []  Right     []  Left  Pre-treatment girth:  Post-treatment girth:  Measured at (location):  Pressure:       [] lo [] med [] hi   Temperature: [] lo [] med [] hi     [x] Skin assessment post-treatment:  [x]intact [x]redness- no adverse reaction  []redness - adverse reaction:     37 min Therapeutic Exercise:  [] See flow sheet :   Rationale: increase ROM and increase strength to improve the patients ability to utilize Left wrist and hand for BADL tasks  LUE:  AROM Wrist ext/flex/sup/pros 3 sets x 15 reps  Ball rolls  Wrist Maze  Velcro dowel board  Sup/pro wheel  Weight well with 1/2 lb weight - wrist flex/ext  Palm to digit with marbles     10 min Self Care/Home Management:  Warm soaks, scar management, activity modification   Rationale: pt education   to improve the patients ability to facilitate left wrist use in home and work tasks    With   [] TE   [] TA   [] neuro   [] other: Patient Education: [x] Review HEP    [] Progressed/Changed HEP based on:   [] positioning   [x] body mechanics   [] transfers   [x] heat/ice application   [] Splint wear/care   [] Sensory re-education   [x] scar management      [] other:            Other Objective/Functional Measures:      Range of Motion:     Elbow/Wrist   Wrist 4/27/2022  Left 5/18/22  Left   5/27/2022  Left       Flex 25  42 (+17) 42 (no change)        Ext -5  40 (+45)         UD 7  20 (+13)         RD 5  11 (+6)         FA              Pro 80  90 (+10)         Sup 18  68 (+50)             Pain Level (0-10 scale) post treatment: 0/10    ASSESSMENT/Changes in Function:   Ongoing sensitivity during ultrasound at distal volar scar. Pt instructed on use of warm soaks, scar massage, and silicone scar gel pad to improve scar management. Pt educated on activity modifications and body mechanics during home management and work tasks.      Patient will continue to benefit from skilled OT services to modify and progress therapeutic interventions, address ROM deficits, address strength deficits, analyze and address soft tissue restrictions and instruct in home and community integration to attain remaining goals. [x]  See Plan of Care  []  See progress note/recertification  []  See Discharge Summary         Progress towards goals / Updated goals:    Goal:* Patient will be compliant with ROM and strengthening home exercise program to take an active role in their rehabilitation process. Status at PN: strengthening not yet initiated.       Goal:* Patient will have 45 degrees of left wrist flexion in order to perform hygiene tasks and /or work with tools such as a screwdriver. Status at PN:  42 (+17) deg.   5/27/2022: 42 deg, no change.      Goal:*Patient will regain 50 degrees flexion of the left thumb to enable grasp of cylindrical objects such as a glass, handle or toothbrush. Status at PN: 48 (+32) deg MCP jt flexion     Goal:* Patient will demonstrate a 0.5 cm decrease in edema of the left wrist in order to improve participation with grooming and dressing tasks. Status at PN: left wrist 18.7cm ( + . 2) slight increase.      Goal: *Patient will improve left hand fine motor speed by at least 15% for better manipulation of small items   Status at PN: right 20 seconds, left 25 seconds.      Goal:* Pt will have 35 pounds of  in the left hand to allow for functional grasp for all ADL activities including dressing, bathing and self care.   Status at PN: NT at this time.      PLAN  []  Upgrade activities as tolerated     [x]  Continue plan of care  []  Update interventions per flow sheet       []  Discharge due to:_  []  Other:_      MARA Adams 5/27/2022  10:54 AM    Future Appointments   Date Time Provider Prabhakar Trivedi   5/27/2022 11:30 AM MARA Paul [facility]   5/31/2022  9:15 AM MARA Paul [facility]   6[facility]2022 10[facility]00 [facility]   6/6/2022 10:00 AM Jose D Kaba OT [facility]   6/6/2022 11:10 AM Gaurang PIPER DO [facility]   6/10/2022 10:00 AM Jose D Kaba OT [facility]   6/13/2022 10:00 AM Jose D Kaba OT [facility]   6/17/2022 10:00 AM MARA Villanueva [facility]

## 2022-05-31 ENCOUNTER — HOSPITAL ENCOUNTER (OUTPATIENT)
Dept: PHYSICAL THERAPY | Age: 42
Discharge: HOME | End: 2022-05-31
Payer: COMMERCIAL

## 2022-05-31 PROCEDURE — 97018 PARAFFIN BATH THERAPY: CPT

## 2022-05-31 PROCEDURE — 97035 APP MDLTY 1+ULTRASOUND EA 15: CPT

## 2022-05-31 PROCEDURE — 97110 THERAPEUTIC EXERCISES: CPT

## 2022-05-31 NOTE — PROGRESS NOTES
OT DAILY TREATMENT NOTE     Patient Name: Corwin Dumont  Date:2022  : 1980  [x]  Patient  Verified  Payor: Ar Alex / Plan: Josey Rubio / Product Type: HMO /    In time: 9:30  Out time: 10:05  Total Treatment Time (min): 35  Visit #: 4 of 8    Medicare/BCBS Only   Total Timed Codes (min):  25 1:1 Treatment Time:  35     Treatment Area: Other intraarticular fracture of lower end of left radius, initial encounter for closed fracture [S52.572A]  Other specified postprocedural states [Z98.890]  Personal history of (healed) traumatic fracture [Z87.81]    SUBJECTIVE  Pain Level (0-10 scale): 0/10  Any medication changes, allergies to medications, adverse drug reactions, diagnosis change, or new procedure performed?: [x] No    [] Yes (see summary sheet for update)  Subjective functional status/changes:   [] No changes reported    \"No pain today\"   \"My hand/wrist have been swelling still\"     OBJECTIVE    Modality rationale: increase tissue extensibility to improve the patients ability to utilize left hand in functional tasks. Min Type Additional Details      []? Estim:  []? Unatt       []? IFC  []? Premod                        []?Other:  []?w/ice   []?w/heat  Position:  Location:      []? Estim: []? Att    []? TENS instruct  []? NMES                    []?Other:  []?w/US   []?w/ice   []?w/heat  Position:  Location:      []? Traction: []? Cervical       []? Lumbar                       []? Prone          []? Supine                       []?Intermittent   []? Continuous Lbs:  []? before manual  []? after manual    8 [x]? Ultrasound: [x]? Continuous   []? Pulsed                           []? 1MHz   [x]? 3MHz W/cm2: 1.0  Location:left volar wrist      []? Iontophoresis with dexamethasone         Location: []? Take home patch   []? In clinic    10  Concurrent with MHP    []? Ice     [x]? heat -MHP  []? Ice massage  []? Laser   [x]?   Paraffin Position:  Seated/resting  Location: hand/left wrist      []?  Laser with stim  []? Other:  Position:  Location:      []? Vasopneumatic Device    []? Right     []? Left  Pre-treatment girth:  Post-treatment girth:  Measured at (location):  Pressure:       []? lo []? med []? hi   Temperature: []? lo []? med []? hi       [x]? Skin assessment post-treatment:  [x]? intact [x]? redness- no adverse reaction  []? redness - adverse reaction:       17 min Therapeutic Exercise:  [] See flow sheet :   Rationale: increase ROM and increase strength to improve the patients ability to move wrist and  for functional daily tasks. Left hand/wrist:     Ball rolls   Towel scrunches   Small hammer stretch     With   [] TE   [] TA   [] neuro   [] other: Patient Education: [x] Review HEP    [x] Progressed/Changed HEP based on: independent with HEP's.   [] positioning   [] body mechanics   [] transfers   [] heat/ice application   [] Splint wear/care   [] Sensory re-education   [] scar management      [] other:            Other Objective/Functional Measures:      Late arrival , shortened session   Pt completed all wrist AROM without pain/discomfort. Pain Level (0-10 scale) post treatment: 0/10    ASSESSMENT/Changes in Function: decreased stiffness with heat modalities, no increase in pain with wrist ROM exercises, improved digit ROM with heat modalities, pt benefits from education on self-management strategies and rehab protocol. Patient will continue to benefit from skilled OT services to address ROM deficits, address strength deficits, analyze and address soft tissue restrictions and analyze and modify body mechanics/ergonomics to attain remaining goals. [x]  See Plan of Care  []  See progress note/recertification  []  See Discharge Summary         Progress towards goals / Updated goals:    Goal:* Patient will be compliant with ROM and strengthening home exercise program to take an active role in their rehabilitation process.   Status at [address] [facility] not yet initiated.       Goal:* Patient will have 45 degrees of left wrist flexion in order to perform hygiene tasks and /or work with tools such as a screwdriver. Status at PN:  42 (+17) deg.   5/27/2022: 42 deg, no change.      Goal:*Patient will regain 50 degrees flexion of the left thumb to enable grasp of cylindrical objects such as a glass, handle or toothbrush. Status at PN: 48 (+32) deg MCP jt flexion     Goal:* Patient will demonstrate a 0.5 cm decrease in edema of the left wrist in order to improve participation with grooming and dressing tasks. Status at PN: left wrist 18.7cm ( + . 2) slight increase.      Goal: *Patient will improve left hand fine motor speed by at least 15% for better manipulation of small items   Status at PN: right 20 seconds, left 25 seconds.      Goal:* Pt will have 35 pounds of  in the left hand to allow for functional grasp for all ADL activities including dressing, bathing and self care.   Status at PN: NT at this time.         PLAN  []  Upgrade activities as tolerated     [x]  Continue plan of care  []  Update interventions per flow sheet       []  Discharge due to:_  []  Other:_      MARA Turcios 5/31/2022  9:35 AM    Future Appointments   Date Time Provider Prabhakar Trivedi   6/3/2022 10:00 AM Joellen , OT [facility]   6/6/2022 10:00 AM Joellen , OT [facility]   6/6/2022 11:10 AM DO ROSS Cerda BS [facility]   6/10/2022 10:00 AM Joellen , OT [facility]   6/13/2022 10:00 AM Joellen , OT [facility]   6/17/2022 10:00 AM MARA Thornton [facility]

## 2022-06-03 ENCOUNTER — HOSPITAL ENCOUNTER (OUTPATIENT)
Dept: PHYSICAL THERAPY | Age: 42
Discharge: HOME | End: 2022-06-03
Payer: COMMERCIAL

## 2022-06-03 PROCEDURE — 97530 THERAPEUTIC ACTIVITIES: CPT

## 2022-06-03 PROCEDURE — 97110 THERAPEUTIC EXERCISES: CPT

## 2022-06-03 PROCEDURE — 97018 PARAFFIN BATH THERAPY: CPT

## 2022-06-03 NOTE — PROGRESS NOTES
OT DAILY TREATMENT NOTE     Patient Name: Marizol Mckeon  BNHZ:[id number]  : 1980  [x]  Patient  Verified  Payor: John Moulton / Plan: Hallie Hill / Product Type: HMO /    In time:10:02 AM  Out time:10:43 AM  Total Treatment Time (min): 41  Visit #: 4 of 8    Medicare/BCBS Only   Total Timed Codes (min):  31 1:1 Treatment Time:  41     Treatment Area: Other intraarticular fracture of lower end of left radius, initial encounter for closed fracture [S52.572A]  Other specified postprocedural states [Z98.890]  Personal history of (healed) traumatic fracture [Z87.81]    SUBJECTIVE  Pain Level (0-10 scale): 0/10  Any medication changes, allergies to medications, adverse drug reactions, diagnosis change, or new procedure performed?: [x] No    [] Yes (see summary sheet for update)  Subjective functional status/changes:   [] No changes reported  \"I am just stiff. \"    \"I go to the doctor next week and hopefully back to work but I have to be able to lift. \"    OBJECTIVE    Modality rationale: decrease pain and increase tissue extensibility to improve the patients ability to move left wrist for functional tasks.     Min Type Additional Details    [] Estim:  []Unatt       []IFC  []Premod                        []Other:  []w/ice   []w/heat  Position:  Location:    [] Estim: []Att    []TENS instruct  []NMES                    []Other:  []w/US   []w/ice   []w/heat  Position:  Location:    []  Traction: [] Cervical       []Lumbar                       [] Prone          []Supine                       []Intermittent   []Continuous Lbs:  [] before manual  [] after manual    []  Ultrasound: []Continuous   [] Pulsed                           []1MHz   []3MHz W/cm2:  Location:    []  Iontophoresis with dexamethasone         Location: [] Take home patch   [] In clinic   10 []  Ice     []  heat  []  Ice massage  []  Laser   [x]  Paraffin Position: seated, resting  Location: left hand/wrist    []  Laser with stim  []  Other: Position:  Location:    []  Vasopneumatic Device    []  Right     []  Left  Pre-treatment girth:  Post-treatment girth:  Measured at (location):  Pressure:       [] lo [] med [] hi   Temperature: [] lo [] med [] hi       [x] Skin assessment post-treatment:  [x]intact [x]redness- no adverse reaction    []redness - adverse reaction:     19 min Therapeutic Exercise:  [x] See flow sheet :   Rationale: increase ROM and increase strength to improve the patients ability to move left wrist for grooming, bathing and dressing tasks    12 min Therapeutic Activity:  [x]  See flow sheet :   Rationale: increase ROM and improve coordination  to improve the patients ability to manipulate items using left hand. With   [] TE   [] TA   [] neuro   [] other: Patient Education: [x] Review HEP    [] Progressed/Changed HEP based on:   [] positioning   [] body mechanics   [] transfers   [] heat/ice application   [] Splint wear/care   [] Sensory re-education   [] scar management      [] other:            Other Objective/Functional Measures:   No discomfort with wrist PROM HEP   6/3/2022 - left wrist cm circum - 17. 9 cm (-0.8), goal met for this session    Pain Level (0-10 scale) post treatment: 0/10    ASSESSMENT/Changes in Function: Initiated wrist PROM during this treatment session and pt had no difficulty with this. Good manipulation of small items in left hand. Reduced edema noted in the left wrist. Progressing well towards goals. Patient will continue to benefit from skilled OT services to modify and progress therapeutic interventions, address ROM deficits, address strength deficits, analyze and address soft tissue restrictions and instruct in home and community integration to attain remaining goals.      []  See Plan of Care  []  See progress note/recertification  []  See Discharge Summary         Progress towards goals / Updated goals:  Goal:* Patient will be compliant with ROM and strengthening home exercise program to take an active role in their rehabilitation process. Status at PN: strengthening not yet initiated.       Goal:* Patient will have 45 degrees of left wrist flexion in order to perform hygiene tasks and /or work with tools such as a screwdriver. Status at PN:  42 (+17) deg.   5/27/2022: 42 deg, no change.      Goal:*Patient will regain 50 degrees flexion of the left thumb to enable grasp of cylindrical objects such as a glass, handle or toothbrush. Status at PN: 48 (+32) deg MCP jt flexion     Goal:* Patient will demonstrate a 0.5 cm decrease in edema of the left wrist in order to improve participation with grooming and dressing tasks. Status at PN: left wrist 18.7cm ( + . 2) slight increase.   6/3/2022 - left wrist cm circum - 17. 9 cm (-0.8), goal met for this session     Goal: *Patient will improve left hand fine motor speed by at least 15% for better manipulation of small items   Status at PN: right 20 seconds, left 25 seconds.      Goal:* Pt will have 35 pounds of  in the left hand to allow for functional grasp for all ADL activities including dressing, bathing and self care.   Status at PN: NT at this time.      PLAN  []  Upgrade activities as tolerated     [x]  Continue plan of care  []  Update interventions per flow sheet       []  Discharge due to:_  []  Other:_      Dorothy Jo OT 6/3/2022  10:08 AM    Future Appointments   Date Time Provider Prabhakar Trivedi   6/6/2022 10:00 AM Alfredo Burnett OT [facility]   6/6/2022 11:10 AM DO ROSS Hicks [facility]   6/10/2022 10:00 AM Alfredo Burnett OT [facility]   6/13/2022 10:00 AM Alfredo Burnett OT [facility]   6/17/2022 10:00 AM MARA Young [facility]

## 2022-06-06 ENCOUNTER — HOSPITAL ENCOUNTER (OUTPATIENT)
Dept: PHYSICAL THERAPY | Age: 42
Discharge: HOME | End: 2022-06-06
Payer: COMMERCIAL

## 2022-06-06 ENCOUNTER — OFFICE VISIT (OUTPATIENT)
Dept: ORTHOPEDIC SURGERY | Age: 42
End: 2022-06-06
Payer: COMMERCIAL

## 2022-06-06 VITALS — BODY MASS INDEX: 28.99 KG/M2 | WEIGHT: 174 LBS | TEMPERATURE: 97.8 F | HEIGHT: 65 IN

## 2022-06-06 DIAGNOSIS — Z87.81 S/P ORIF (OPEN REDUCTION INTERNAL FIXATION) FRACTURE: Primary | ICD-10-CM

## 2022-06-06 DIAGNOSIS — Z98.890 S/P ORIF (OPEN REDUCTION INTERNAL FIXATION) FRACTURE: Primary | ICD-10-CM

## 2022-06-06 DIAGNOSIS — S52.572D OTHER CLOSED INTRA-ARTICULAR FRACTURE OF DISTAL END OF LEFT RADIUS WITH ROUTINE HEALING, SUBSEQUENT ENCOUNTER: ICD-10-CM

## 2022-06-06 PROCEDURE — 73110 X-RAY EXAM OF WRIST: CPT | Performed by: ORTHOPAEDIC SURGERY

## 2022-06-06 PROCEDURE — 97110 THERAPEUTIC EXERCISES: CPT

## 2022-06-06 PROCEDURE — 97018 PARAFFIN BATH THERAPY: CPT

## 2022-06-06 PROCEDURE — 97530 THERAPEUTIC ACTIVITIES: CPT

## 2022-06-06 PROCEDURE — 99024 POSTOP FOLLOW-UP VISIT: CPT | Performed by: ORTHOPAEDIC SURGERY
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
## 2022-06-10 ENCOUNTER — HOSPITAL ENCOUNTER (OUTPATIENT)
Dept: PHYSICAL THERAPY | Age: 42
Discharge: HOME | End: 2022-06-10
Payer: COMMERCIAL

## 2022-06-10 PROCEDURE — 97018 PARAFFIN BATH THERAPY: CPT

## 2022-06-10 PROCEDURE — 97110 THERAPEUTIC EXERCISES: CPT

## 2022-06-10 NOTE — PROGRESS NOTES
OT DAILY TREATMENT NOTE     Patient Name: Maddy Notice  Date:6/10/2022  : 1980  [x]  Patient  Verified  Payor: Cesia Espinoza / Plan: Seymour Oats / Product Type: HMO /    In time:10:01 AM  Out time:10:41 AM  Total Treatment Time (min): 40  Visit #: 6 of 8    Medicare/BCBS Only   Total Timed Codes (min):  30 1:1 Treatment Time:  40     Treatment Area: Other intraarticular fracture of lower end of left radius, initial encounter for closed fracture [S52.572A]  Other specified postprocedural states [Z98.890]  Personal history of (healed) traumatic fracture [Z87.81]    SUBJECTIVE  Pain Level (0-10 scale): 0/10  Any medication changes, allergies to medications, adverse drug reactions, diagnosis change, or new procedure performed?: [x] No    [] Yes (see summary sheet for update)  Subjective functional status/changes:   [] No changes reported  \"It feels like I have another stitch here (distal scar). \"    OBJECTIVE    Modality rationale: decrease pain and increase tissue extensibility to improve the patients ability to functionally use left hand and wrist   Min Type Additional Details    [] Estim:  []Unatt       []IFC  []Premod                        []Other:  []w/ice   []w/heat  Position:  Location:    [] Estim: []Att    []TENS instruct  []NMES                    []Other:  []w/US   []w/ice   []w/heat  Position:  Location:    []  Traction: [] Cervical       []Lumbar                       [] Prone          []Supine                       []Intermittent   []Continuous Lbs:  [] before manual  [] after manual    []  Ultrasound: []Continuous   [] Pulsed                           []1MHz   []3MHz W/cm2:  Location:    []  Iontophoresis with dexamethasone         Location: [] Take home patch   [] In clinic   10 []  Ice     []  heat  []  Ice massage  []  Laser   [x]  Paraffin Position:seated, resting  Location: left hand and wrist    []  Laser with stim  []  Other:  Position:  Location:    []  Vasopneumatic Device []  Right     []  Left  Pre-treatment girth:  Post-treatment girth:  Measured at (location):  Pressure:       [] lo [] med [] hi   Temperature: [] lo [] med [] hi       [x] Skin assessment post-treatment:  [x]intact [x]redness- no adverse reaction    []redness - adverse reaction:     30 min Therapeutic Exercise:  [x] See flow sheet :   Rationale: increase ROM and increase strength to improve the patients ability to grasp, , lift and bear weight through left UE. With   [] TE   [] TA   [] neuro   [] other: Patient Education: [x] Review HEP    [] Progressed/Changed HEP based on:   [] positioning   [] body mechanics   [] transfers   [] heat/ice application   [] Splint wear/care   [] Sensory re-education   [] scar management      [] other:            Other Objective/Functional Measures:    Measurements: Taken with Erasto Dynamometer, in Lbs   Level 2 6/6/2022 Date   Right 24     Left 16     Deficit       Change             Pinch Measurements: Taken with Pinch Gauge, in Lbs   (hand) 6/6/2022    Date Date   Lateral          Right 19       Left  11       Deficit         Change         Pad         Right 17       Left 10       Deficit         Change         [place]         Right 24       Left 14       Deficit         Change            THUMB ROM CHART as measured in degrees  Thumb, Side  Active/Passive Date:  4/27/2022 5/18/2022  Left 6/6/2022  Left thumb   MP 14-16  10- 48 (+32) 0-50   IP 0-12  0-50 (+38) 0-54   Radial Abd. 35  58 (+23)     Palmar Abd.         Opposition IF and MF only  all          Range of Motion:     Elbow/Wrist   Wrist 4/27/2022  Left 5/18/22  Left  6/10/2022   Left    Flex 25  42 (+17)  50   Ext -5  40 (+45) 59    UD 7  20 (+13)  38   RD 5  11 (+6)  20   FA          Pro 80  90 (+10)     Sup 18  68 (+50)  80     Pain Level (0-10 scale) post treatment: 0/10    ASSESSMENT/Changes in Function: Improved wrist and FA AROM noted since beginning PROM last week.   Initiated wrist strengthening with 2# weight and pt demonstrated no difficulty with this. Pt also provided and instructed in  and pinch strengthening HEP with soft therapy putty. Progressing well towards goals. Patient will continue to benefit from skilled OT services to modify and progress therapeutic interventions, address ROM deficits, address strength deficits, analyze and address soft tissue restrictions and instruct in home and community integration to attain remaining goals. []  See Plan of Care  []  See progress note/recertification  []  See Discharge Summary         Progress towards goals / Updated goals:  Goal:* Patient will be compliant with ROM and strengthening home exercise program to take an active role in their rehabilitation process. Status at [address] [facility] not yet initiated.   6/10/2022 - fair compliance with ROM HEP however demonstrates improved AROM, initiated  and pinch HEP during this treatment session      Goal:* Patient will have 45 degrees of left wrist flexion in order to perform hygiene tasks and /or work with tools such as a screwdriver. Status at PN:  42 (+17) deg.   5/27/2022: 42 deg, no change.   6/10/2022 - 50 deg left wrist flexion AROM, goal met     Goal:*Patient will regain 50 degrees flexion of the left thumb to enable grasp of cylindrical objects such as a glass, handle or toothbrush. Status at PN: 48 (+32) deg MCP jt flexion  6/6/2022 - 50 deg left thumb MCP jt flexion, goal met     Goal:* Patient will demonstrate a 0.5 cm decrease in edema of the left wrist in order to improve participation with grooming and dressing tasks.   Status at PN: left wrist 18.7cm ( + . 2) slight increase.   6/3/2022 - left wrist cm circum - 17. 9 cm (-0.8), goal met for this session     Goal: *Patient will improve left hand fine motor speed by at least 15% for better manipulation of small items   Status at PN: right 20 seconds, left 25 seconds.      Goal:* Pt will have 35 pounds of  in the left hand to allow for functional grasp for all ADL activities including dressing, bathing and self care. Status at PN: NT at this time.     PLAN  []  Upgrade activities as tolerated     [x]  Continue plan of care  []  Update interventions per flow sheet       []  Discharge due to:_  []  Other:_      Silvano Calderón OT 6/10/2022  9:52 AM    Future Appointments   Date Time Provider Prabhakar Trivedi   6/10/2022 10:00 [facility]   6/13/2022 10:00 AM [facility]   6/17/2022 10:00 AM [facility]

## 2022-06-13 ENCOUNTER — HOSPITAL ENCOUNTER (OUTPATIENT)
Dept: PHYSICAL THERAPY | Age: 42
Discharge: HOME | End: 2022-06-13
Payer: COMMERCIAL

## 2022-06-13 PROCEDURE — 97018 PARAFFIN BATH THERAPY: CPT

## 2022-06-13 PROCEDURE — 97110 THERAPEUTIC EXERCISES: CPT

## 2022-06-13 NOTE — PROGRESS NOTES
OT DAILY TREATMENT NOTE     Patient Name: Kaila Castro  Date:2022  : 1980  [x]  Patient  Verified  Payor: Janny Giron / Plan: [employer] / Product Type: HMO /    In time:10:01 AM  Out time:10:44 AM  Total Treatment Time (min): 43  Visit #: 7 of 8    Medicare/BCBS Only   Total Timed Codes (min):  33 1:1 Treatment Time:  43     Treatment Area: Other intraarticular fracture of lower end of left radius, initial encounter for closed fracture [S52.572A]  Other specified postprocedural states [Z98.890]  Personal history of (healed) traumatic fracture [Z87.81]    SUBJECTIVE  Pain Level (0-10 scale): 0/10  Any medication changes, allergies to medications, adverse drug reactions, diagnosis change, or new procedure performed?: [x] No    [] Yes (see summary sheet for update)  Subjective functional status/changes:   [] No changes reported  \"Is it normal for arthritis to set in this early? \"    OBJECTIVE    Modality rationale: increase tissue extensibility to improve the patients ability to functionally use left wrist and hand for daily tasks.     Min Type Additional Details    [] Estim:  []Unatt       []IFC  []Premod                        []Other:  []w/ice   []w/heat  Position:  Location:    [] Estim: []Att    []TENS instruct  []NMES                    []Other:  []w/US   []w/ice   []w/heat  Position:  Location:    []  Traction: [] Cervical       []Lumbar                       [] Prone          []Supine                       []Intermittent   []Continuous Lbs:  [] before manual  [] after manual    []  Ultrasound: []Continuous   [] Pulsed                           []1MHz   []3MHz W/cm2:  Location:    []  Iontophoresis with dexamethasone         Location: [] Take home patch   [] In clinic   10 []  Ice     []  heat  []  Ice massage  []  Laser   [x]  Paraffin Position: seated, resting  Location: left hand wrist    []  Laser with stim  []  Other:  Position:  Location:    []  Vasopneumatic Device    []  Right []  Left  Pre-treatment girth:  Post-treatment girth:  Measured at (location):  Pressure:       [] lo [] med [] hi   Temperature: [] lo [] med [] hi       [x] Skin assessment post-treatment:  [x]intact [x]redness- no adverse reaction    []redness - adverse reaction:     33 min Therapeutic Exercise:  [x] See flow sheet :   Rationale: increase ROM and increase strength to improve the patients ability to grasp, , lift and bear weight through left UE. With   [] TE   [] TA   [] neuro   [] other: Patient Education: [x] Review HEP    [] Progressed/Changed HEP based on:   [] positioning   [] body mechanics   [] transfers   [] heat/ice application   [] Splint wear/care   [] Sensory re-education   [] scar management      [] other:            Other Objective/Functional Measures:    Measurements: Taken with Erasto Dynamometer, in Lbs   Level 2 6/6/2022 Date   Right 24     Left 16     Deficit       Change             Pinch Measurements: Taken with Pinch Gauge, in Lbs   (hand) 6/6/2022    Date Date   Lateral          Right 19       Left  11       Deficit         Change         Pad         Right 17       Left 10       Deficit         Change         Vernon         Right 24       Left 14       Deficit         Change            THUMB ROM CHART as measured in degrees  Thumb, Side  Active/Passive Date:  4/27/2022 5/18/2022  Left 6/6/2022  Left thumb   MP 14-16  10- 48 (+32) 0-50   IP 0-12  0-50 (+38) 0-54   Radial Abd. 35  58 (+23)     Palmar Abd.         Opposition IF and MF only  all          Range of Motion:     Elbow/Wrist   Wrist 4/27/2022  Left 5/18/22  Left  6/10/2022   Left    Flex 25  42 (+17)  50   Ext -5  40 (+45) 59    UD 7  20 (+13)  38   RD 5  11 (+6)  20   FA          Pro 80  90 (+10)     Sup 18  68 (+50)  80        Pain Level (0-10 scale) post treatment: 0/10    ASSESSMENT/Changes in Function: Pt c/o joint pain in left hand digits. Discussed wear of fingerless compression garment to reduce pain with use. Good tolerance to 15# gripper for 1 inch peg retrieval from foam board with no complaints of pain in hand. Reduced stiffness noted after therex. Discussed d/c at next treatment session as long as no new symptoms occur and pt agrees with this. Patient will continue to benefit from skilled OT services to modify and progress therapeutic interventions, address ROM deficits, address strength deficits, analyze and address soft tissue restrictions and instruct in home and community integration to attain remaining goals. []  See Plan of Care  []  See progress note/recertification  []  See Discharge Summary         Progress towards goals / Updated goals:  Goal:* Patient will be compliant with ROM and strengthening home exercise program to take an active role in their rehabilitation process. Status at [address] [facility] not yet initiated.   6/10/2022 - fair compliance with ROM HEP however demonstrates improved AROM, initiated  and pinch HEP during this treatment session  6/13/2022 - pt compliant with HEP and progressing well towards goas, goal met      Goal:* Patient will have 45 degrees of left wrist flexion in order to perform hygiene tasks and /or work with tools such as a screwdriver. Status at PN:  42 (+17) deg.   5/27/2022: 42 deg, no change.   6/10/2022 - 50 deg left wrist flexion AROM, goal met     Goal:*Patient will regain 50 degrees flexion of the left thumb to enable grasp of cylindrical objects such as a glass, handle or toothbrush. Status at PN: 48 (+32) deg MCP jt flexion  6/6/2022 - 50 deg left thumb MCP jt flexion, goal met     Goal:* Patient will demonstrate a 0.5 cm decrease in edema of the left wrist in order to improve participation with grooming and dressing tasks.   Status at PN: left wrist 18.7cm ( + . 2) slight increase.   6/3/2022 - left wrist cm circum - 17. 9 cm (-0.8), goal met for this session     Goal: *Patient will improve left hand fine motor speed by at least 15% for better manipulation of small items   Status at PN: right 20 seconds, left 25 seconds.      Goal:* Pt will have 35 pounds of  in the left hand to allow for functional grasp for all ADL activities including dressing, bathing and self care.   Status at PN: NT at this time.     PLAN  []  Upgrade activities as tolerated     [x]  Continue plan of care  []  Update interventions per flow sheet       []  Discharge due to:_  []  Other:_      Marielena Stack OT 6/13/2022  9:04 AM    Future Appointments   Date Time Provider Prabhakar Trivedi   6/13/2022 10:00 AM [facility]   6/17/2022 10:00 AM [facility]

## 2022-06-17 ENCOUNTER — HOSPITAL ENCOUNTER (OUTPATIENT)
Dept: PHYSICAL THERAPY | Age: 42
Discharge: HOME | End: 2022-06-17
Payer: COMMERCIAL

## 2022-06-17 PROCEDURE — 97535 SELF CARE MNGMENT TRAINING: CPT

## 2022-06-17 PROCEDURE — 97110 THERAPEUTIC EXERCISES: CPT

## 2022-06-17 PROCEDURE — 97018 PARAFFIN BATH THERAPY: CPT

## 2022-06-17 NOTE — PROGRESS NOTES
[facility] [address] [place] [address] [address]  [phone] fax  Patient Name: Suzan Marie  Date:2022  : 1980  [x]  Patient  Verified      Hand Therapy/ Occupational Therapy Discharge Instructions        Continue with home exercise program for 3 times a day for 4 weeks then decrease to 1 times a day as needed/patient discretion. Continue to apply heat as needed per day. Follow up with MD: as needed      Recommendations: __x__ Return to activity with home program            ____ Return to activity with the following modifcations                       ____ Post Rehab Program                                  ____Return to/Join local gym    Additional comments:          Please call with any questions or concerns. Thank you for your participation.          Dorothy Jo OT 2022  10:35 AM

## 2022-06-17 NOTE — PROGRESS NOTES
OT DISCHARGE DAILY NOTE AND SUMMARY     Date:2022  Patient name: Maddy Ashraf Start of Care: 2022   Referral source: Flora Jackson DO : 1980   Medical/Treatment Diagnosis: Other intraarticular fracture of lower end of left radius, initial encounter for closed fracture [S52.572A]  Other specified postprocedural states [Z98.890]  Personal history of (healed) traumatic fracture [Z87.81] Onset Date:3/29/2022, sx 2022       Prior Hospitalization: see medical history Provider#: [id number]   Medications: Verified on Patient Summary List    Comorbidities: none reported  Prior Level of Function:gardening, (I) with ADL/IADL tasks without functional limitations and pain using left hand and wrist    Visits from Start of Care: 18    Missed Visits: 0    Reporting Period : 2022 to 2022     [x]  Patient  Verified  Payor: Cesia Espinoza / Plan: [employer] / Product Type: HMO /    In time:10:03 AM  Out time:10:43 AM  Total Treatment Time (min): 40  Visit #: 8 of 8    Medicare/BCBS Only   Total Timed Codes (min):  30 1:1 Treatment Time:  40       Treatment Area: Other intraarticular fracture of lower end of left radius, initial encounter for closed fracture [S52.572A]  Other specified postprocedural states [Z98.890]  Personal history of (healed) traumatic fracture [Z87.81]    SUBJECTIVE  Pain Level (0-10 scale): 0/10  Any medication changes, allergies to medications, adverse drug reactions, diagnosis change, or new procedure performed?: [x] No    [] Yes (see summary sheet for update)  Subjective functional status/changes:   [] No changes reported  \"I feel good. I start back work next week. \"    \"I have really come a long way. \"    OBJECTIVE    Modality rationale: increase tissue extensibility to improve the patients ability to functionally use left hand for all normal daily tasks.     Min Type Additional Details    [] Estim:  []Unatt       []IFC  []Premod                        []Other:  []w/ice []w/heat  Position:  Location:    [] Estim: []Att    []TENS instruct  []NMES                    []Other:  []w/US   []w/ice   []w/heat  Position:  Location:    []  Traction: [] Cervical       []Lumbar                       [] Prone          []Supine                       []Intermittent   []Continuous Lbs:  [] before manual  [] after manual    []  Ultrasound: []Continuous   [] Pulsed                           []1MHz   []3MHz W/cm2:  Location:    []  Iontophoresis with dexamethasone         Location: [] Take home patch   [] In clinic   10 []  Ice     [x]  Heat  paraffin  []  Ice massage  []  Laser   []  Anodyne Position:seated, resting  Location: left hand and wrist    []  Laser with stim  []  Other:  Position:  Location:    []  Vasopneumatic Device    []  Right     []  Left  Pre-treatment girth:  Post-treatment girth:  Measured at (location):  Pressure:       [] lo [] med [] hi   Temperature: [] lo [] med [] hi   [x] Skin assessment post-treatment:  [x]intact [x]redness- no adverse reaction    []redness - adverse reaction:     22 min Therapeutic Exercise:  [x] See flow sheet :   Rationale: increase ROM, increase strength, improve coordination and increase proprioception to improve the patients ability to bear weight and return to normal functional use of left UE    8 min Self Care/Home Management: d/c instruct, HEP instruct   Rationale: education  to improve the patients ability to return to PLOF    With   [] TE   [] TA   [] neuro   [] other: Patient Education: [x] Review HEP    [] Progressed/Changed HEP based on:   [] positioning   [] body mechanics   [] transfers   [] heat/ice application   [] Splint wear/care   [] Sensory re-education   [] scar management      [] other:            Other Objective/Functional Measures:    Measurements: Taken with Erasto Dynamometer, in Lbs   Level 2 6/6/2022 6/17/2022   Right 24     Left 16  44   Deficit       Change             Pinch Measurements: Taken with Pinch Gauge, in Lbs   (hand) 6/6/2022 6/17/2022    Date   Lateral          Right 19       Left  11  12     Deficit         Change         Pad         Right 17       Left 10  11     Deficit         Change         Vernon         Right 24       Left 14 15      Deficit         Change            THUMB ROM CHART as measured in degrees  Thumb, Side  Active/Passive Date:  4/27/2022 5/18/2022  Left 6/6/2022  Left thumb 6/17/2022  Left thumb   MP 14-16  10- 48 (+32) 0-50 0-50   IP 0-12  0-50 (+38) 0-54 0-70   Radial Abd. 35  58 (+23)      Palmar Abd.          Opposition IF and MF only  all           Range of Motion:     Elbow/Wrist   Wrist 4/27/2022  Left 5/18/22  Left  6/10/2022   Left  6/17/2022  Left   Flex 25  42 (+17)  50 50   Ext -5  40 (+45) 59  59   UD 7  20 (+13)  38    RD 5  11 (+6)  20    FA           Pro 80  90 (+10)      Sup 18  68 (+50)  80 80         Nine Hole Peg test - left - 22 seconds    FOTO 69 (+34)     Pain Level (0-10 scale) post treatment: 0/10    Summary of Care:  Goal:* Patient will be compliant with ROM and strengthening home exercise program to take an active role in their rehabilitation process. Status at [id number] [facility] not yet initiated.   6/10/2022 - fair compliance with ROM HEP however demonstrates improved AROM, initiated  and pinch HEP during this treatment session  6/13/2022 - pt compliant with HEP and progressing well towards goas, goal met  Status at d/c 6/17/2022 - pt reports compliance with PROM and  strengthening HEP, goal met      Goal:* Patient will have 45 degrees of left wrist flexion in order to perform hygiene tasks and /or work with tools such as a screwdriver. Status at PN:  42 (+17) deg.   5/27/2022: 42 deg, no change.   6/10/2022 - 50 deg left wrist flexion AROM, goal met  Status at d/c 6/17/2022 - 50 deg, goal met      Goal:*Patient will regain 50 degrees flexion of the left thumb to enable grasp of cylindrical objects such as a glass, handle or toothbrush.   Status at PN: 48 (+32) deg MCP jt flexion  6/6/2022 - 50 deg left thumb MCP jt flexion, goal met  Status at d/c 6/17/2022 - 50 deg, goal met      Goal:* Patient will demonstrate a 0.5 cm decrease in edema of the left wrist in order to improve participation with grooming and dressing tasks. Status at PN: left wrist 18.7cm ( + . 2) slight increase.   6/3/2022 - left wrist cm circum - 17. 9 cm (-0.8), goal met for this session   Status at d/c 6/17/2022 - 17.8 cm goal met     Goal: *Patient will improve left hand fine motor speed by at least 15% for better manipulation of small items   Status at PN: right 20 seconds, left 25 seconds.    Status at d/c 6/17/2022 - 22 seconds, 12% improved goal met     Goal:* Pt will have 35 pounds of  in the left hand to allow for functional grasp for all ADL activities including dressing, bathing and self care. Status at PN: NT at this time. Status at d/c 6/17/2022 - 44#, goal met     ASSESSMENT/Changes in Function: Pt has met goals set for this reporting period. He demonstrates improved wrist and FA AROM and good left hand  strength. He has made measurable progress with therapy and is returning to work next week. Pt issued medium therapy putty for HEP and re-educated on wrist and hand  strengthening during this treatment session. Pt was provided written d/c instructions. All concerns addressed and pt agrees to d/c at this time.  Thank you for this referral.     PLAN:  [x]Discontinue therapy: [x]Patient has reached or is progressing toward set goals      []Patient is non-compliant or has abdicated      []Due to lack of appreciable progress towards set goals    Thank you for this referral!    Олег Pineda OT 6/17/2022 10:12 AM

## 2022-11-09 ENCOUNTER — DOCUMENTATION ONLY (OUTPATIENT)
Dept: ORTHOPEDIC SURGERY | Age: 42
End: 2022-11-09

## (undated) DEVICE — INTENDED FOR TISSUE SEPARATION, AND OTHER PROCEDURES THAT REQUIRE A SHARP SURGICAL BLADE TO PUNCTURE OR CUT.: Brand: BARD-PARKER SAFETY BLADES SIZE 15, STERILE

## (undated) DEVICE — THREE-QUARTER SHEET: Brand: CONVERTORS

## (undated) DEVICE — BANDAGE COMPR EXSANGUATION SGL LAYERED NO CLSR 9FT LEN 4IN W

## (undated) DEVICE — GARMENT,MEDLINE,DVT,INT,CALF,FOAM,MED: Brand: MEDLINE

## (undated) DEVICE — KIT CLN UP BON SECOURS MARYV

## (undated) DEVICE — BANDAGE,ELASTIC,ESMARK,STERILE,4"X9',LF: Brand: MEDLINE

## (undated) DEVICE — 2.3MM X 26MM NON-TOGGLING CORTICAL SCREW
Type: IMPLANTABLE DEVICE | Site: WRIST | Status: NON-FUNCTIONAL
Brand: ACUMED
Removed: 2022-04-12

## (undated) DEVICE — BLANKET WRM AD PREM MISTRAL-AIR

## (undated) DEVICE — BIPOLAR FORCEPS CORD: Brand: VALLEYLAB

## (undated) DEVICE — DRAPE EQUIP CARM MINI STRL

## (undated) DEVICE — IMMOBILIZER SHLDR L L18IN D9IN UNIV POLY COT W/ FOAM STRP

## (undated) DEVICE — DRAPE,HAND,STERILE: Brand: MEDLINE

## (undated) DEVICE — BNDG CMPR ELAS KNT VEL STD 3IN -- MEDICHOICE

## (undated) DEVICE — HOOK LOCK LATEX FREE ELASTIC BANDAGE 3INX5YD

## (undated) DEVICE — GOWN,REINFORCE,POLY,SIRUS,SETSLV,XLARGE: Brand: MEDLINE

## (undated) DEVICE — SOLUTION IV 1000ML 0.9% SOD CHL

## (undated) DEVICE — .054" X 6" GUIDE WIRE: Brand: ACUMED

## (undated) DEVICE — GAUZE,SPONGE,4"X4",16PLY,STRL,LF,10/TRAY: Brand: MEDLINE

## (undated) DEVICE — STRETCH BANDAGE ROLL: Brand: DERMACEA

## (undated) DEVICE — SUTURE MCRYL SZ 3-0 L27IN ABSRB UD L26MM SH 1/2 CIR Y416H

## (undated) DEVICE — STERILE POLYISOPRENE POWDER-FREE SURGICAL GLOVES: Brand: PROTEXIS

## (undated) DEVICE — INTENDED FOR TISSUE SEPARATION, AND OTHER PROCEDURES THAT REQUIRE A SHARP SURGICAL BLADE TO PUNCTURE OR CUT.: Brand: BARD-PARKER ®  SAFETY SCALPED

## (undated) DEVICE — 1.5MM HEX DRIVER TIP, LOCKING GROOVE: Brand: ACUMED

## (undated) DEVICE — NEEDLE NRV BLK 21GA L4IN 30DEG INSUL BVL EXTN SET STIMUPLEX

## (undated) DEVICE — BLADE OPHTH MINI BEAV SHRP --

## (undated) DEVICE — 2.8MM QR DRILL, W/ DEPTH MARKS: Brand: ACUMED

## (undated) DEVICE — SUT MCRYL + 4-0 18IN PS-2 UND --

## (undated) DEVICE — PADDING CAST COHESIVE 4 YDX3 IN HND TEARABLE COTTON SPEC 100

## (undated) DEVICE — 2.0MM QUICK RELEASE DRILL: Brand: ACUMED

## (undated) DEVICE — PREP SKN CHLRAPRP APL 26ML STR --

## (undated) DEVICE — PAD,ABDOMINAL,8"X10",ST,LF: Brand: MEDLINE

## (undated) DEVICE — T15 STICK FIT HEXALOBE DRIVER: Brand: ACUMED